# Patient Record
Sex: MALE | Race: WHITE | NOT HISPANIC OR LATINO | Employment: FULL TIME | URBAN - NONMETROPOLITAN AREA
[De-identification: names, ages, dates, MRNs, and addresses within clinical notes are randomized per-mention and may not be internally consistent; named-entity substitution may affect disease eponyms.]

---

## 2018-07-11 ENCOUNTER — HOSPITAL ENCOUNTER (EMERGENCY)
Facility: HOSPITAL | Age: 23
Discharge: HOME | End: 2018-07-11
Attending: EMERGENCY MEDICINE | Admitting: EMERGENCY MEDICINE

## 2018-07-11 VITALS
RESPIRATION RATE: 18 BRPM | SYSTOLIC BLOOD PRESSURE: 135 MMHG | HEIGHT: 68 IN | TEMPERATURE: 97.5 F | WEIGHT: 230 LBS | BODY MASS INDEX: 34.86 KG/M2 | HEART RATE: 91 BPM | OXYGEN SATURATION: 100 % | DIASTOLIC BLOOD PRESSURE: 88 MMHG

## 2018-07-11 DIAGNOSIS — M54.42 ACUTE LEFT-SIDED LOW BACK PAIN WITH LEFT-SIDED SCIATICA: Primary | ICD-10-CM

## 2018-07-11 PROCEDURE — 96372 THER/PROPH/DIAG INJ SC/IM: CPT

## 2018-07-11 PROCEDURE — 25010000002 KETOROLAC TROMETHAMINE PER 15 MG: Performed by: EMERGENCY MEDICINE

## 2018-07-11 PROCEDURE — 99283 EMERGENCY DEPT VISIT LOW MDM: CPT

## 2018-07-11 RX ORDER — KETOROLAC TROMETHAMINE 30 MG/ML
60 INJECTION, SOLUTION INTRAMUSCULAR; INTRAVENOUS ONCE
Status: COMPLETED | OUTPATIENT
Start: 2018-07-11 | End: 2018-07-11

## 2018-07-11 RX ORDER — PREDNISONE 20 MG/1
20 TABLET ORAL DAILY
Qty: 10 TABLET | Refills: 0 | Status: SHIPPED | OUTPATIENT
Start: 2018-07-11 | End: 2021-02-01

## 2018-07-11 RX ORDER — NAPROXEN 500 MG/1
500 TABLET ORAL 2 TIMES DAILY PRN
Qty: 20 TABLET | Refills: 0 | Status: SHIPPED | OUTPATIENT
Start: 2018-07-11 | End: 2021-02-01

## 2018-07-11 RX ORDER — CYCLOBENZAPRINE HCL 10 MG
10 TABLET ORAL 3 TIMES DAILY PRN
Qty: 15 TABLET | Refills: 0 | Status: SHIPPED | OUTPATIENT
Start: 2018-07-11 | End: 2021-02-01

## 2018-07-11 RX ADMIN — KETOROLAC TROMETHAMINE 60 MG: 30 INJECTION, SOLUTION INTRAMUSCULAR at 13:05

## 2018-07-11 NOTE — ED PROVIDER NOTES
Subjective   Patient is a 22-year-old male presents to emergency department complaining of low back pain.  His left sided radiates to the left leg.  There is no loss of bowel or bladder control or weakness in the legs.  There's been no saddle anesthesia.  The patient is ambulatory to the emergency department.  He states he has a history of back injury when he was in high school.            Review of Systems   Musculoskeletal: Positive for back pain.   All other systems reviewed and are negative.      Past Medical History:   Diagnosis Date   • Injury of back        No Known Allergies    History reviewed. No pertinent surgical history.    History reviewed. No pertinent family history.    Social History     Social History   • Marital status: Single     Social History Main Topics   • Smoking status: Current Some Day Smoker   • Smokeless tobacco: Never Used   • Alcohol use Yes   • Drug use: Yes     Types: Marijuana   • Sexual activity: Defer     Other Topics Concern   • Not on file           Objective   Physical Exam   Constitutional: He is oriented to person, place, and time. He appears well-developed and well-nourished.   HENT:   Head: Normocephalic and atraumatic.   Eyes: Conjunctivae are normal. Pupils are equal, round, and reactive to light.   Neck: Normal range of motion. Neck supple.   Cardiovascular: Normal rate, regular rhythm, normal heart sounds and intact distal pulses.    Pulmonary/Chest: Effort normal and breath sounds normal.   Abdominal: Soft. Bowel sounds are normal.   Musculoskeletal:   Chest tenderness to palpation in the left paraspinous musculature.  There is no midline pain.  There is normal strength and lower extremity's.  No saddle anesthesia is present.   Neurological: He is alert and oriented to person, place, and time.   Skin: Skin is warm.   Psychiatric: He has a normal mood and affect.       Procedures           ED Course                  MDM  Number of Diagnoses or Management  Options  Diagnosis management comments: Patient appears to have general muscular skeletal back pain there's no clinical or historical concerns for cauda equina syndrome        Final diagnoses:   None            Rober Ramos MD  07/11/18 5074

## 2019-03-28 ENCOUNTER — APPOINTMENT (OUTPATIENT)
Dept: CT IMAGING | Facility: HOSPITAL | Age: 24
End: 2019-03-28

## 2019-03-28 ENCOUNTER — HOSPITAL ENCOUNTER (EMERGENCY)
Facility: HOSPITAL | Age: 24
Discharge: HOME | End: 2019-03-28
Attending: EMERGENCY MEDICINE | Admitting: EMERGENCY MEDICINE

## 2019-03-28 ENCOUNTER — APPOINTMENT (OUTPATIENT)
Dept: GENERAL RADIOLOGY | Facility: HOSPITAL | Age: 24
End: 2019-03-28

## 2019-03-28 VITALS
RESPIRATION RATE: 18 BRPM | BODY MASS INDEX: 34.86 KG/M2 | DIASTOLIC BLOOD PRESSURE: 84 MMHG | HEIGHT: 68 IN | WEIGHT: 230 LBS | SYSTOLIC BLOOD PRESSURE: 123 MMHG | TEMPERATURE: 98.5 F | OXYGEN SATURATION: 99 % | HEART RATE: 108 BPM

## 2019-03-28 DIAGNOSIS — R74.8 ELEVATED LIVER ENZYMES: ICD-10-CM

## 2019-03-28 DIAGNOSIS — K92.0 HEMATEMESIS, PRESENCE OF NAUSEA NOT SPECIFIED: ICD-10-CM

## 2019-03-28 DIAGNOSIS — Z78.9 ALCOHOL USE: ICD-10-CM
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
## 2019-03-28 LAB
ALBUMIN SERPL-MCNC: 4.8 G/DL (ref 3.5–5)
ALBUMIN/GLOB SERPL: 1.3 G/DL (ref 1–2)
ALP SERPL-CCNC: 79 U/L (ref 38–126)
ALT SERPL W P-5'-P-CCNC: 120 U/L (ref 13–69)
AMPHET+METHAMPHET UR QL: NEGATIVE
AMPHETAMINES UR QL: NEGATIVE
ANION GAP SERPL CALCULATED.3IONS-SCNC: 21.1 MMOL/L (ref 10–20)
AST SERPL-CCNC: 117 U/L (ref 15–46)
BARBITURATES UR QL SCN: NEGATIVE
BASOPHILS # BLD AUTO: 0.07 10*3/MM3 (ref 0–0.2)
BASOPHILS NFR BLD AUTO: 0.9 % (ref 0–1.5)
BENZODIAZ UR QL SCN: NEGATIVE
BILIRUB SERPL-MCNC: 0.5 MG/DL (ref 0.2–1.3)
BUN BLD-MCNC: 11 MG/DL (ref 7–20)
BUN/CREAT SERPL: 15.7 (ref 6.3–21.9)
BUPRENORPHINE SERPL-MCNC: NEGATIVE NG/ML
CALCIUM SPEC-SCNC: 9 MG/DL (ref 8.4–10.2)
CANNABINOIDS SERPL QL: NEGATIVE
CHLORIDE SERPL-SCNC: 107 MMOL/L (ref 98–107)
CO2 SERPL-SCNC: 23 MMOL/L (ref 26–30)
COCAINE UR QL: NEGATIVE
CREAT BLD-MCNC: 0.7 MG/DL (ref 0.6–1.3)
DEPRECATED RDW RBC AUTO: 41.5 FL (ref 37–54)
EOSINOPHIL # BLD AUTO: 0.14 10*3/MM3 (ref 0–0.4)
EOSINOPHIL NFR BLD AUTO: 1.9 % (ref 0.3–6.2)
ERYTHROCYTE [DISTWIDTH] IN BLOOD BY AUTOMATED COUNT: 12.7 % (ref 12.3–15.4)
GFR SERPL CREATININE-BSD FRML MDRD: 137 ML/MIN/1.73
GLOBULIN UR ELPH-MCNC: 3.6 GM/DL
GLUCOSE BLD-MCNC: 126 MG/DL (ref 74–98)
HCT VFR BLD AUTO: 43.3 % (ref 37.5–51)
HGB BLD-MCNC: 15 G/DL (ref 13–17.7)
IMM GRANULOCYTES # BLD AUTO: 0.04 10*3/MM3 (ref 0–0.05)
IMM GRANULOCYTES NFR BLD AUTO: 0.5 % (ref 0–0.5)
LYMPHOCYTES # BLD AUTO: 3.86 10*3/MM3 (ref 0.7–3.1)
LYMPHOCYTES NFR BLD AUTO: 51.7 % (ref 19.6–45.3)
MCH RBC QN AUTO: 31 PG (ref 26.6–33)
MCHC RBC AUTO-ENTMCNC: 34.6 G/DL (ref 31.5–35.7)
MCV RBC AUTO: 89.5 FL (ref 79–97)
METHADONE UR QL SCN: NEGATIVE
MONOCYTES # BLD AUTO: 0.49 10*3/MM3 (ref 0.1–0.9)
MONOCYTES NFR BLD AUTO: 6.6 % (ref 5–12)
NEUTROPHILS # BLD AUTO: 2.87 10*3/MM3 (ref 1.4–7)
NEUTROPHILS NFR BLD AUTO: 38.4 % (ref 42.7–76)
NRBC BLD AUTO-RTO: 0 /100 WBC (ref 0–0)
OPIATES UR QL: NEGATIVE
OXYCODONE UR QL SCN: NEGATIVE
PCP UR QL SCN: NEGATIVE
PLATELET # BLD AUTO: 177 10*3/MM3 (ref 140–450)
PMV BLD AUTO: 10.2 FL (ref 6–12)
POTASSIUM BLD-SCNC: 4.1 MMOL/L (ref 3.5–5.1)
PROPOXYPH UR QL: NEGATIVE
PROT SERPL-MCNC: 8.4 G/DL (ref 6.3–8.2)
RBC # BLD AUTO: 4.84 10*6/MM3 (ref 4.14–5.8)
SODIUM BLD-SCNC: 147 MMOL/L (ref 137–145)
TRICYCLICS UR QL SCN: NEGATIVE
TROPONIN I SERPL-MCNC: <0.012 NG/ML (ref 0–0.03)
WBC NRBC COR # BLD: 7.47 10*3/MM3 (ref 3.4–10.8)

## 2019-03-28 PROCEDURE — 70450 CT HEAD/BRAIN W/O DYE: CPT

## 2019-03-28 PROCEDURE — 85025 COMPLETE CBC W/AUTO DIFF WBC: CPT | Performed by: PHYSICIAN ASSISTANT

## 2019-03-28 PROCEDURE — 74177 CT ABD & PELVIS W/CONTRAST: CPT

## 2019-03-28 PROCEDURE — 72125 CT NECK SPINE W/O DYE: CPT

## 2019-03-28 PROCEDURE — 80053 COMPREHEN METABOLIC PANEL: CPT | Performed by: PHYSICIAN ASSISTANT

## 2019-03-28 PROCEDURE — 84484 ASSAY OF TROPONIN QUANT: CPT | Performed by: PHYSICIAN ASSISTANT

## 2019-03-28 PROCEDURE — 71046 X-RAY EXAM CHEST 2 VIEWS: CPT

## 2019-03-28 PROCEDURE — 99283 EMERGENCY DEPT VISIT LOW MDM: CPT

## 2019-03-28 PROCEDURE — 80307 DRUG TEST PRSMV CHEM ANLYZR: CPT | Performed by: PHYSICIAN ASSISTANT

## 2019-03-28 PROCEDURE — 25010000002 IOPAMIDOL 61 % SOLUTION: Performed by: EMERGENCY MEDICINE

## 2019-03-28 PROCEDURE — 80306 DRUG TEST PRSMV INSTRMNT: CPT | Performed by: PHYSICIAN ASSISTANT

## 2019-03-28 PROCEDURE — 93005 ELECTROCARDIOGRAM TRACING: CPT | Performed by: PHYSICIAN ASSISTANT

## 2019-03-28 RX ORDER — SODIUM CHLORIDE 0.9 % (FLUSH) 0.9 %
10 SYRINGE (ML) INJECTION AS NEEDED
Status: DISCONTINUED | OUTPATIENT
Start: 2019-03-28 | End: 2019-03-29 | Stop reason: HOSPADM

## 2019-03-28 RX ADMIN — IOPAMIDOL 100 ML: 612 INJECTION, SOLUTION INTRAVENOUS at 20:20

## 2019-03-28 RX ADMIN — SODIUM CHLORIDE 1000 ML: 9 INJECTION, SOLUTION INTRAVENOUS at 18:34

## 2019-03-29 LAB
ETHANOL BLD-MCNC: 426 MG/DL
ETHANOL UR QL: 0.43 %

## 2021-02-01 ENCOUNTER — HOSPITAL ENCOUNTER (OUTPATIENT)
Dept: GENERAL RADIOLOGY | Facility: HOSPITAL | Age: 26
Discharge: HOME | End: 2021-02-01

## 2021-02-01 ENCOUNTER — LAB (OUTPATIENT)
Dept: LAB | Facility: HOSPITAL | Age: 26
End: 2021-02-01

## 2021-02-01 ENCOUNTER — OFFICE VISIT (OUTPATIENT)
Dept: INTERNAL MEDICINE | Facility: CLINIC | Age: 26
End: 2021-02-01

## 2021-02-01 VITALS
TEMPERATURE: 98.6 F | WEIGHT: 231.8 LBS | HEIGHT: 68 IN | BODY MASS INDEX: 35.13 KG/M2 | OXYGEN SATURATION: 92 % | HEART RATE: 61 BPM | DIASTOLIC BLOOD PRESSURE: 70 MMHG | SYSTOLIC BLOOD PRESSURE: 110 MMHG

## 2021-02-01 DIAGNOSIS — Z13.220 SCREENING, LIPID: ICD-10-CM

## 2021-02-01 DIAGNOSIS — R79.89 ELEVATED LFTS: ICD-10-CM

## 2021-02-01 DIAGNOSIS — Z13.1 SCREENING FOR DIABETES MELLITUS: ICD-10-CM

## 2021-02-01 DIAGNOSIS — Z23 NEED FOR VACCINATION: ICD-10-CM

## 2021-02-01 DIAGNOSIS — G89.29 CHRONIC HIP PAIN, RIGHT: ICD-10-CM

## 2021-02-01 DIAGNOSIS — M25.551 CHRONIC HIP PAIN, RIGHT: ICD-10-CM

## 2021-02-01 DIAGNOSIS — K42.9 UMBILICAL HERNIA WITHOUT OBSTRUCTION AND WITHOUT GANGRENE: ICD-10-CM

## 2021-02-01 DIAGNOSIS — Z53.20 SCREENING FOR HEPATITIS C DECLINED: ICD-10-CM

## 2021-02-01 DIAGNOSIS — G47.00 INSOMNIA, UNSPECIFIED TYPE: ICD-10-CM

## 2021-02-01 DIAGNOSIS — E66.3 OVERWEIGHT: ICD-10-CM

## 2021-02-01 DIAGNOSIS — Z71.3 DIETARY COUNSELING: ICD-10-CM

## 2021-02-01 DIAGNOSIS — Z71.82 EXERCISE COUNSELING: ICD-10-CM

## 2021-02-01 DIAGNOSIS — Z00.00 ROUTINE GENERAL MEDICAL EXAMINATION AT A HEALTH CARE FACILITY: Primary | ICD-10-CM

## 2021-02-01 LAB
ALBUMIN SERPL-MCNC: 4.8 G/DL (ref 3.5–5.2)
ALBUMIN/GLOB SERPL: 1.9 G/DL
ALP SERPL-CCNC: 66 U/L (ref 39–117)
ALT SERPL W P-5'-P-CCNC: 18 U/L (ref 1–41)
ANION GAP SERPL CALCULATED.3IONS-SCNC: 11.8 MMOL/L (ref 5–15)
AST SERPL-CCNC: 21 U/L (ref 1–40)
BASOPHILS # BLD AUTO: 0.04 10*3/MM3 (ref 0–0.2)
BASOPHILS NFR BLD AUTO: 0.5 % (ref 0–1.5)
BILIRUB SERPL-MCNC: 0.4 MG/DL (ref 0–1.2)
BUN SERPL-MCNC: 17 MG/DL (ref 6–20)
BUN/CREAT SERPL: 22.7 (ref 7–25)
CALCIUM SPEC-SCNC: 9.8 MG/DL (ref 8.6–10.5)
CHLORIDE SERPL-SCNC: 101 MMOL/L (ref 98–107)
CHOLEST SERPL-MCNC: 253 MG/DL (ref 0–200)
CO2 SERPL-SCNC: 24.2 MMOL/L (ref 22–29)
CREAT SERPL-MCNC: 0.75 MG/DL (ref 0.76–1.27)
DEPRECATED RDW RBC AUTO: 40.5 FL (ref 37–54)
EOSINOPHIL # BLD AUTO: 0.19 10*3/MM3 (ref 0–0.4)
EOSINOPHIL NFR BLD AUTO: 2.2 % (ref 0.3–6.2)
ERYTHROCYTE [DISTWIDTH] IN BLOOD BY AUTOMATED COUNT: 13.3 % (ref 12.3–15.4)
GFR SERPL CREATININE-BSD FRML MDRD: 125 ML/MIN/1.73
GLOBULIN UR ELPH-MCNC: 2.5 GM/DL
GLUCOSE SERPL-MCNC: 80 MG/DL (ref 65–99)
HCT VFR BLD AUTO: 44.5 % (ref 37.5–51)
HCV AB SER DONR QL: NORMAL
HDLC SERPL-MCNC: 44 MG/DL (ref 40–60)
HGB BLD-MCNC: 15.2 G/DL (ref 13–17.7)
LDLC SERPL CALC-MCNC: 198 MG/DL (ref 0–100)
LDLC/HDLC SERPL: 4.45 {RATIO}
LYMPHOCYTES # BLD AUTO: 3.67 10*3/MM3 (ref 0.7–3.1)
LYMPHOCYTES NFR BLD AUTO: 42.9 % (ref 19.6–45.3)
MCH RBC QN AUTO: 28.8 PG (ref 26.6–33)
MCHC RBC AUTO-ENTMCNC: 34.2 G/DL (ref 31.5–35.7)
MCV RBC AUTO: 84.3 FL (ref 79–97)
MONOCYTES # BLD AUTO: 0.48 10*3/MM3 (ref 0.1–0.9)
MONOCYTES NFR BLD AUTO: 5.6 % (ref 5–12)
NEUTROPHILS NFR BLD AUTO: 4.13 10*3/MM3 (ref 1.7–7)
NEUTROPHILS NFR BLD AUTO: 48.3 % (ref 42.7–76)
PLATELET # BLD AUTO: 141 10*3/MM3 (ref 140–450)
PMV BLD AUTO: 12.1 FL (ref 6–12)
POTASSIUM SERPL-SCNC: 4.2 MMOL/L (ref 3.5–5.2)
PROT SERPL-MCNC: 7.3 G/DL (ref 6–8.5)
RBC # BLD AUTO: 5.28 10*6/MM3 (ref 4.14–5.8)
SODIUM SERPL-SCNC: 137 MMOL/L (ref 136–145)
TRIGL SERPL-MCNC: 65 MG/DL (ref 0–150)
VLDLC SERPL-MCNC: 11 MG/DL (ref 5–40)
WBC # BLD AUTO: 8.55 10*3/MM3 (ref 3.4–10.8)

## 2021-02-01 PROCEDURE — 99395 PREV VISIT EST AGE 18-39: CPT | Performed by: PHYSICIAN ASSISTANT

## 2021-02-01 PROCEDURE — 36415 COLL VENOUS BLD VENIPUNCTURE: CPT

## 2021-02-01 PROCEDURE — 85025 COMPLETE CBC W/AUTO DIFF WBC: CPT | Performed by: PHYSICIAN ASSISTANT

## 2021-02-01 PROCEDURE — 80053 COMPREHEN METABOLIC PANEL: CPT | Performed by: PHYSICIAN ASSISTANT

## 2021-02-01 PROCEDURE — 73502 X-RAY EXAM HIP UNI 2-3 VIEWS: CPT

## 2021-02-01 PROCEDURE — 90715 TDAP VACCINE 7 YRS/> IM: CPT | Performed by: PHYSICIAN ASSISTANT

## 2021-02-01 PROCEDURE — 83036 HEMOGLOBIN GLYCOSYLATED A1C: CPT | Performed by: PHYSICIAN ASSISTANT

## 2021-02-01 PROCEDURE — 80061 LIPID PANEL: CPT | Performed by: PHYSICIAN ASSISTANT

## 2021-02-01 PROCEDURE — 90471 IMMUNIZATION ADMIN: CPT | Performed by: PHYSICIAN ASSISTANT

## 2021-02-01 PROCEDURE — 86803 HEPATITIS C AB TEST: CPT | Performed by: PHYSICIAN ASSISTANT

## 2021-02-01 NOTE — PROGRESS NOTES
Venu Jessica Bustillos 25 y.o. male presents today to establish care.    Establish Care, Annual Exam, and Hip Pain       Hip Pain: started 1.5 yrs ago after a fall at work and has flared up with other slips and falls (hx of football injuries as well, has been rough on his body). He did have sciatica on the right side in the past after hurting his back. Is going to chiropractor. Taking tumeric and glucosamine regularly and will take IBU if it acts up. It bothers him on a regular basis. Has clicking of his hip sometimes with walking. Does not have swelling or redness of hip. Lifting his leg up causes pain.    Hx Alcohol Abuse: had elevated LFTs at ER visit 1-2 yrs ago that was likely related to heavy alcohol abuse. Has not had alcohol for 2 years now. Works at [employer] now after going through their program for drug/alcohol abuse.    Anx/Insomnia: does not have daily problem with anxiety but does have trouble sleeping sometimes due to his mind racing. Does not have hi/si, or depression.    Strong fhx of heart problems, mom had cardiac bypass sx age 29. Is not fasting today.    Had STD testing last year, has tested negative for Hep C and HIV.  Has not been sexually active in the last year.    Umbilical hernia: feels like this started a few years ago with hard coughing. Sometimes it sticks out when he coughs or does exercise.    Has not seen dentist or eye dr recently, is planning on scheduling these soon.    Diet/exercise: is working on weight loss. Has lost 65 lbs over the last year due to healthier eating and working out at the gym.        Review of Systems   Constitutional: Negative for fever and unexpected weight loss.   HENT: Negative for congestion, rhinorrhea, sneezing and sore throat.    Eyes: Negative for visual disturbance.   Respiratory: Negative for cough, shortness of breath and wheezing.    Cardiovascular: Negative for chest pain and palpitations.   Gastrointestinal: Negative for abdominal pain,  "constipation, diarrhea, nausea and vomiting.   Endocrine: Negative for polydipsia and polyuria.   Musculoskeletal: Positive for back pain. Negative for arthralgias, joint swelling and neck pain.   Skin: Negative for color change and rash.   Neurological: Positive for headache. Negative for dizziness and seizures.   Psychiatric/Behavioral: Positive for sleep disturbance. Negative for decreased concentration, self-injury and suicidal ideas.        Past Medical History:   Diagnosis Date   • Injury of back         Past Surgical History:   Procedure Laterality Date   • TONSILLECTOMY          Family History   Problem Relation Age of Onset   • Diabetes Mother    • Diabetes Father         Social History     Socioeconomic History   • Marital status: Single     Spouse name: Not on file   • Number of children: Not on file   • Years of education: Not on file   • Highest education level: Not on file   Tobacco Use   • Smoking status: Current Some Day Smoker   • Smokeless tobacco: Never Used   Substance and Sexual Activity   • Alcohol use: Not Currently   • Drug use: Not Currently     Types: Marijuana   • Sexual activity: Defer        No current outpatient medications on file prior to visit.     No current facility-administered medications on file prior to visit.        No Known Allergies     Vitals:    02/01/21 1307 02/01/21 1318   BP:  110/70   Pulse:  61   Temp: 97.1 °F (36.2 °C) 98.6 °F (37 °C)   TempSrc: Temporal Temporal   SpO2:  92%   Weight:  105 kg (231 lb 12.8 oz)   Height:  171.5 cm (67.5\")      Body mass index is 35.77 kg/m².    Physical Exam  Vitals signs reviewed.   Constitutional:       General: He is not in acute distress.     Appearance: Normal appearance. He is not ill-appearing.   HENT:      Head: Normocephalic and atraumatic.      Right Ear: Tympanic membrane, ear canal and external ear normal.      Left Ear: Tympanic membrane, ear canal and external ear normal.      Mouth/Throat:      Mouth: Mucous membranes " are moist.      Pharynx: No oropharyngeal exudate or posterior oropharyngeal erythema.   Eyes:      General: No scleral icterus.     Extraocular Movements: Extraocular movements intact.      Conjunctiva/sclera: Conjunctivae normal.      Pupils: Pupils are equal, round, and reactive to light.   Neck:      Musculoskeletal: Normal range of motion and neck supple.   Cardiovascular:      Rate and Rhythm: Normal rate and regular rhythm.      Pulses: Normal pulses.      Heart sounds: Normal heart sounds. No murmur.   Pulmonary:      Effort: Pulmonary effort is normal. No respiratory distress.      Breath sounds: Normal breath sounds. No stridor. No wheezing, rhonchi or rales.   Abdominal:      General: Bowel sounds are normal. There is no distension.      Palpations: Abdomen is soft. There is no mass.      Tenderness: There is no abdominal tenderness. There is no guarding.      Hernia: A hernia is present.      Comments: Very small umbilical hernia   Musculoskeletal: Normal range of motion.         General: No signs of injury.      Right lower leg: No edema.      Left lower leg: No edema.   Lymphadenopathy:      Cervical: No cervical adenopathy.   Skin:     General: Skin is warm and dry.      Coloration: Skin is not jaundiced.      Findings: No rash.   Neurological:      General: No focal deficit present.      Mental Status: He is alert and oriented to person, place, and time.      Gait: Gait normal.   Psychiatric:         Mood and Affect: Mood normal.         Behavior: Behavior normal.          Diagnoses and all orders for this visit:    1. Routine general medical examination at a health care facility (Primary)    2. Chronic hip pain, right  Comments:  check xray, if nl will do PT, if abnl can refer to ortho. cont ibu prn for pain  Orders:  -     XR Hip With or Without Pelvis 2 - 3 View Right; Future    3. Insomnia, unspecified type  Comments:  melatonin prn, consider therapy to address mild anxiety sx.  Orders:  -      CBC Auto Differential; Future    4. Elevated LFTs  Comments:  recheck cmp today  Orders:  -     Comprehensive Metabolic Panel; Future    5. Umbilical hernia without obstruction and without gangrene  Comments:  monitor at this time, is hard to appreciate    6. Screening for hepatitis C declined  -     Hepatitis C Antibody; Future    7. Screening for diabetes mellitus  -     Hemoglobin A1c; Future    8. Screening, lipid  -     Lipid Panel; Future    9. Need for vaccination  Comments:  tdap today, pt refused flu shot.  Orders:  -     Tdap Vaccine Greater Than or Equal To 6yo IM    10. Overweight    11. Dietary counseling    12. Exercise counseling    Other orders  -     Cancel: TSH Rfx On Abnormal To Free T4; Future      Counseled on health maintenance topics and preventative care recommendations. Follow up yearly for routine physical exams. Diet and exercise counseling given. See dentist and eye doctor yearly as directed.    If labs or images are ordered we will contact you with the results within the next week.  If you have not heard from us after a week please call our office to inquire about the results.    Patient was given instructions and counseling regarding his condition or for health maintenance advice. Please see specific information pulled into the AVS if appropriate.     Return in about 4 weeks (around 3/1/2021).    Catherine Edwards PA-C

## 2021-02-02 ENCOUNTER — TELEPHONE (OUTPATIENT)
Dept: INTERNAL MEDICINE | Facility: CLINIC | Age: 26
End: 2021-02-02

## 2021-02-02 DIAGNOSIS — M16.11 OSTEOARTHRITIS OF RIGHT HIP, UNSPECIFIED OSTEOARTHRITIS TYPE: Primary | ICD-10-CM

## 2021-02-02 DIAGNOSIS — E78.5 HYPERLIPIDEMIA, UNSPECIFIED HYPERLIPIDEMIA TYPE: Primary | ICD-10-CM

## 2021-02-02 LAB — HBA1C MFR BLD: 5.2 % (ref 4.8–5.6)

## 2021-02-02 RX ORDER — ATORVASTATIN CALCIUM 40 MG/1
40 TABLET, FILM COATED ORAL DAILY
Qty: 30 TABLET | Refills: 2 | Status: SHIPPED | OUTPATIENT
Start: 2021-02-02 | End: 2021-06-16 | Stop reason: SDUPTHER

## 2021-02-02 NOTE — PROGRESS NOTES
Please call the patient regarding abnormal labs. His liver enzymes are back to normal. However his bad cholesterol is VERY high, so high that regardless of his young age a statin would be recommended. In light of his strong family history for heart problems I feel this is an inherited condition for him and diet/exercise may help but he will still ultimately need cholesterol medication. I will send to the pharmacy.

## 2021-02-02 NOTE — TELEPHONE ENCOUNTER
PN pt expressed understanding. He stated he wants to see if he could fast and redo labs and wants to come  copy of results today so he could run it by his mother who is a nurse. I informed labs will be at the .       ----- Message from Catherine Edwards PA-C sent at 2/2/2021  8:50 AM EST -----  Please call the patient regarding abnormal labs. His liver enzymes are back to normal. However his bad cholesterol is VERY high, so high that regardless of his young age a statin would be recommended. In light of his strong family history for heart problems I feel this is an inherited condition for him and diet/exercise may help but he will still ultimately need cholesterol medication. I will send to the pharmacy.

## 2021-02-03 ENCOUNTER — TELEPHONE (OUTPATIENT)
Dept: INTERNAL MEDICINE | Facility: CLINIC | Age: 26
End: 2021-02-03

## 2021-02-03 NOTE — TELEPHONE ENCOUNTER
Contacted pt lft vm to cb to inform of XR results       ----- Message from Catherine Edwards PA-C sent at 2/2/2021 10:15 PM EST -----  He has degenerative changes in his hip suggestive of arthritis. We can refer to ortho for evaluation and management options.

## 2021-02-03 NOTE — TELEPHONE ENCOUNTER
Caller: Venu Bain    Relationship to patient: Self    Best call back number: [phone]    Patient is needing: Patient is returning April's call.

## 2021-02-03 NOTE — PROGRESS NOTES
He has degenerative changes in his hip suggestive of arthritis. We can refer to ortho for evaluation and management options.

## 2021-02-03 NOTE — TELEPHONE ENCOUNTER
LVM for the patient to return our call, office number was provided      Please see other telephone message as well. Okay to relay both to the patient.

## 2021-02-03 NOTE — TELEPHONE ENCOUNTER
LVM for the patient to return our call, office number was provided      Please see other telephone message. Okay to relay both to the patient when he returns our call.

## 2021-02-03 NOTE — TELEPHONE ENCOUNTER
----- Message from Catherine Edwards PA-C sent at 2/2/2021 10:15 PM EST -----  He has degenerative changes in his hip suggestive of arthritis. We can refer to ortho for evaluation and management options.

## 2021-02-03 NOTE — TELEPHONE ENCOUNTER
Yes he can come in anytime for fasting lipids, I will put the order in. If he wants to work on diet and exercise for another month or two and then get them repeated that is ok as well.

## 2021-02-05 NOTE — TELEPHONE ENCOUNTER
PN pt expressed understanding and will come do labs when he is ready.      Yes he can come in anytime for fasting lipids, I will put the order in. If he wants to work on diet and exercise for another month or two and then get them repeated that is ok as well.    -Catherine Edwards

## 2021-02-08 ENCOUNTER — OFFICE VISIT (OUTPATIENT)
Dept: ORTHOPEDIC SURGERY | Facility: CLINIC | Age: 26
End: 2021-02-08

## 2021-02-08 VITALS — HEART RATE: 88 BPM | OXYGEN SATURATION: 99 % | HEIGHT: 68 IN | BODY MASS INDEX: 35.08 KG/M2 | WEIGHT: 231.48 LBS

## 2021-02-08 DIAGNOSIS — M25.551 RIGHT HIP PAIN: Primary | ICD-10-CM

## 2021-02-08 DIAGNOSIS — M25.859 FEMORAL ACETABULAR IMPINGEMENT: ICD-10-CM

## 2021-02-08 PROCEDURE — 99204 OFFICE O/P NEW MOD 45 MIN: CPT | Performed by: ORTHOPAEDIC SURGERY

## 2021-02-08 NOTE — PROGRESS NOTES
"      [facility] Note    Subjective     CC: Pain of the Right Hip      HPI    Venu Bustillos is a 25 y.o. male who presents with new problem of: right hip pain.  Onset: atraumatic and gradual in nature. The issue has been ongoing for 2 year(s). Pain is a 2/10 on the pain scale. Pain is described as dull and aching. Associated symptoms include pain and giving way/buckling. The pain is worse with walking and climbing stairs; resting improve the pain. Previous treatments have included: weight loss and home exercise program.    I have reviewed the following portions of the patient's history:History of Present Illness and review of systems.          Review of Systems   Constitutional: Negative.    HENT: Negative.    Eyes: Negative.    Respiratory: Negative.    Cardiovascular: Negative.    Gastrointestinal: Negative.    Endocrine: Negative.    Genitourinary: Negative.    Musculoskeletal: Positive for arthralgias.   Skin: Negative.    Allergic/Immunologic: Negative.    Neurological: Negative.    Hematological: Negative.    Psychiatric/Behavioral: Negative.        ROS:    Constiutional:Pt denies fever, chills, nausea, or vomiting.  MSK:as above      Objective      Past Medical History  Past Medical History:   Diagnosis Date   • Injury of back          Physical Exam  Pulse 88   Ht 171.5 cm (67.52\")   Wt 105 kg (231 lb 7.7 oz)   SpO2 99%   BMI 35.70 kg/m²     Body mass index is 35.7 kg/m².    Patient is well nourished and well developed.        Ortho Exam  He lacks internal rotation.  Painful range of motion right hip.  Distally neuro is intact.  Leg lengths equal.  Slight antalgic gait.    Imaging/Labs/EMG Reviewed:  Imaging Results (Last 24 Hours)     ** No results found for the last 24 hours. **      Abuse x-rays from February 1 which show cam and pincer femoral acetabular impingement.  There is a lucency in his anterior lateral femoral head worrisome for avascular " necrosis.    Assessment:  1. Right hip pain    2. Femoral acetabular impingement        Plan:  1. Recommend over the counter anti-inflammatories for pain and/or swelling  2. I have ordered an MRI to rule out avascular necrosis.  He deftly has femoral acetabular impingement.  I will see him back after the MRI.  He has a concerning lucency in his femoral head.  He may use a cane or crutches in the meantime.  Over-the-counter medicine.    Follow Up:   Return for After MRI.      Medical Decision Making  Management Options : Moderate - 1 Undiagnosed New Problem with Uncertain Prognosis and 1 of 3 Categories = Category 1 - Tests/Documents/Review of History Category 2 - Independent Interpret Tests Category 3 - Discussion of Mgmt or test interpretation with external MD Demetri Crocker M.D., [facility]  Orthopedic Surgeon  Fellowship Trained Sports Medicine  [facility]  Orthopedics and Sports Medicine  [address] [place] [address], [address]  [place] [address]

## 2021-02-22 ENCOUNTER — HOSPITAL ENCOUNTER (OUTPATIENT)
Dept: MRI IMAGING | Facility: HOSPITAL | Age: 26
Discharge: HOME | End: 2021-02-22
Admitting: ORTHOPAEDIC SURGERY

## 2021-02-22 DIAGNOSIS — M25.859 FEMORAL ACETABULAR IMPINGEMENT: ICD-10-CM

## 2021-02-22 DIAGNOSIS — M25.551 RIGHT HIP PAIN: ICD-10-CM

## 2021-02-22 PROCEDURE — 73721 MRI JNT OF LWR EXTRE W/O DYE: CPT

## 2021-03-01 ENCOUNTER — OFFICE VISIT (OUTPATIENT)
Dept: ORTHOPEDIC SURGERY | Facility: CLINIC | Age: 26
End: 2021-03-01

## 2021-03-01 VITALS — HEIGHT: 68 IN | HEART RATE: 65 BPM | WEIGHT: 227.07 LBS | BODY MASS INDEX: 34.41 KG/M2 | OXYGEN SATURATION: 98 %

## 2021-03-01 DIAGNOSIS — M87.051 AVASCULAR NECROSIS OF BONE OF HIP, RIGHT (HCC): Primary | ICD-10-CM

## 2021-03-01 PROCEDURE — 99213 OFFICE O/P EST LOW 20 MIN: CPT | Performed by: ORTHOPAEDIC SURGERY

## 2021-03-01 NOTE — PROGRESS NOTES
"      [facility] [place] [facility] [place]    Subjective     CC: Follow-up (Post MRI 02/22/2021 - Right hip pain )      HPI    Venu Bustillos is a 25 y.o. male.  He hurt his hip November 2019.  He has been sober for 2 years.  He works in a restaurant kitchen right now.  Pain with certain activity.    Review of Systems   Constitutional: Negative.  Negative for chills, fatigue and fever.   HENT: Negative.  Negative for congestion and dental problem.    Eyes: Negative.  Negative for blurred vision.   Respiratory: Negative.  Negative for shortness of breath.    Cardiovascular: Negative.  Negative for leg swelling.   Gastrointestinal: Negative.  Negative for abdominal pain.   Endocrine: Negative.  Negative for polyuria.   Genitourinary: Negative.  Negative for difficulty urinating.   Musculoskeletal: Positive for arthralgias.   Skin: Negative.    Allergic/Immunologic: Negative.    Neurological: Negative.    Hematological: Negative.  Negative for adenopathy.   Psychiatric/Behavioral: Negative.  Negative for behavioral problems.       ROS:    Constiutional:Pt denies fever, chills, nausea, or vomiting.  MSK:as above      Objective      Past Medical History  Past Medical History:   Diagnosis Date   • Injury of back          Physical Exam  Pulse 65   Ht 171.5 cm (67.52\")   Wt 103 kg (227 lb 1.2 oz)   SpO2 98%   BMI 35.02 kg/m²     Body mass index is 35.02 kg/m².    Patient is well nourished and well developed.        Ortho Exam  Some pain with hip internal ex rotation and hip flexion.    Imaging/Labs/EMG Reviewed:  Imaging Results (Last 24 Hours)     ** No results found for the last 24 hours. **        MRI Hip Right Without Contrast (02/22/2021 09:50)    Assessment:  1. Avascular necrosis of bone of hip, right (CMS/[facility])        Plan:  1. Recommend over the counter anti-inflammatories for pain and/or swelling  2. I will refer him to Dr. Andrew Estrella the [facility].  He is a hip specialist on hip preservation techniques " which might be an option at his young age.    Follow Up:   No follow-ups on file.      Medical Decision Making  Management Options : Low - 1 of 2 Categories = Category 1 - Review of Tests and Documents Category 2 - Assessment requiring an independent interpretation of tests         Demetri Crocker M.D., FAAOS  Orthopedic Surgeon  Fellowship Trained Sports Medicine  [facility]  Orthopedics and Sports Medicine  [address] [place] [address], [address]  [place] [address]

## 2021-04-13 ENCOUNTER — LAB (OUTPATIENT)
Dept: LAB | Facility: HOSPITAL | Age: 26
End: 2021-04-13

## 2021-04-13 DIAGNOSIS — E78.5 HYPERLIPIDEMIA, UNSPECIFIED HYPERLIPIDEMIA TYPE: ICD-10-CM

## 2021-04-13 LAB
CHOLEST SERPL-MCNC: 246 MG/DL (ref 0–200)
HDLC SERPL-MCNC: 41 MG/DL (ref 40–60)
LDLC SERPL CALC-MCNC: 197 MG/DL (ref 0–100)
LDLC/HDLC SERPL: 4.75 {RATIO}
TRIGL SERPL-MCNC: 51 MG/DL (ref 0–150)
VLDLC SERPL-MCNC: 8 MG/DL (ref 5–40)

## 2021-04-13 PROCEDURE — 80061 LIPID PANEL: CPT | Performed by: PHYSICIAN ASSISTANT

## 2021-04-16 NOTE — PROGRESS NOTES
Please let patient know his LDL cholesterol is still just as high as it was 2 months ago.  A cholesterol medicine would be recommended despite his young age due to the very high level of LDL cholesterol.  If he is wanting to avoid starting a statin at this time we could try a supplement such as over-the-counter fish oil along with talking to a nutritionist about the diet and exercise changes he can make.  Did he  the medicine that we sent in a few months ago?  Please let me know if he has any questions or concerns and how he would like to proceed.

## 2021-04-20 ENCOUNTER — TELEPHONE (OUTPATIENT)
Dept: INTERNAL MEDICINE | Facility: CLINIC | Age: 26
End: 2021-04-20

## 2021-04-20 DIAGNOSIS — E78.5 HYPERLIPIDEMIA, UNSPECIFIED HYPERLIPIDEMIA TYPE: Primary | ICD-10-CM

## 2021-04-20 NOTE — TELEPHONE ENCOUNTER
----- Message from Catherine Edwards PA-C sent at 4/20/2021  3:43 PM EDT -----  He can continue the over the counter fish oil/krill oil supplements and work on weight loss, diet, exercise. Red Yeast Rice is another supplement for cholesterol lowering that he can look into. Please schedule follow up for 3 months so we can recheck cholesterol at that time.  I placed a referral for nutritionist so they can discuss more detailed diet changes for managing cholesterol.

## 2021-06-01 ENCOUNTER — APPOINTMENT (OUTPATIENT)
Dept: NUTRITION | Facility: HOSPITAL | Age: 26
End: 2021-06-01

## 2021-06-16 ENCOUNTER — OFFICE VISIT (OUTPATIENT)
Dept: INTERNAL MEDICINE | Facility: CLINIC | Age: 26
End: 2021-06-16

## 2021-06-16 ENCOUNTER — LAB (OUTPATIENT)
Dept: LAB | Facility: HOSPITAL | Age: 26
End: 2021-06-16

## 2021-06-16 VITALS
DIASTOLIC BLOOD PRESSURE: 78 MMHG | TEMPERATURE: 97.5 F | WEIGHT: 230 LBS | BODY MASS INDEX: 34.86 KG/M2 | SYSTOLIC BLOOD PRESSURE: 122 MMHG | HEART RATE: 75 BPM | OXYGEN SATURATION: 98 % | HEIGHT: 68 IN | RESPIRATION RATE: 20 BRPM

## 2021-06-16 DIAGNOSIS — R53.83 FATIGUE, UNSPECIFIED TYPE: ICD-10-CM

## 2021-06-16 DIAGNOSIS — E78.5 HYPERLIPIDEMIA, UNSPECIFIED HYPERLIPIDEMIA TYPE: Primary | ICD-10-CM

## 2021-06-16 DIAGNOSIS — E78.5 HYPERLIPIDEMIA, UNSPECIFIED HYPERLIPIDEMIA TYPE: ICD-10-CM

## 2021-06-16 DIAGNOSIS — M87.00 AVASCULAR NECROSIS (HCC): ICD-10-CM

## 2021-06-16 DIAGNOSIS — F41.9 ANXIETY: ICD-10-CM

## 2021-06-16 DIAGNOSIS — Z83.3 FAMILY HISTORY OF DIABETES MELLITUS: ICD-10-CM

## 2021-06-16 LAB
CHOLEST SERPL-MCNC: 291 MG/DL (ref 0–200)
HBA1C MFR BLD: 5.4 %
HDLC SERPL-MCNC: 54 MG/DL (ref 40–60)
LDLC SERPL CALC-MCNC: 227 MG/DL (ref 0–100)
LDLC/HDLC SERPL: 4.14 {RATIO}
TESTOST SERPL-MCNC: 434 NG/DL (ref 249–836)
TRIGL SERPL-MCNC: 66 MG/DL (ref 0–150)
TSH SERPL DL<=0.05 MIU/L-ACNC: 1.71 UIU/ML (ref 0.27–4.2)
VLDLC SERPL-MCNC: 10 MG/DL (ref 5–40)

## 2021-06-16 PROCEDURE — 99214 OFFICE O/P EST MOD 30 MIN: CPT | Performed by: PHYSICIAN ASSISTANT

## 2021-06-16 PROCEDURE — 80061 LIPID PANEL: CPT | Performed by: PHYSICIAN ASSISTANT

## 2021-06-16 PROCEDURE — 84403 ASSAY OF TOTAL TESTOSTERONE: CPT | Performed by: PHYSICIAN ASSISTANT

## 2021-06-16 PROCEDURE — 84443 ASSAY THYROID STIM HORMONE: CPT | Performed by: PHYSICIAN ASSISTANT

## 2021-06-16 PROCEDURE — 36415 COLL VENOUS BLD VENIPUNCTURE: CPT

## 2021-06-16 PROCEDURE — 83036 HEMOGLOBIN GLYCOSYLATED A1C: CPT | Performed by: PHYSICIAN ASSISTANT

## 2021-06-16 RX ORDER — NAPROXEN SODIUM 500 MG/1
1000 TABLET, FILM COATED, EXTENDED RELEASE ORAL DAILY
COMMUNITY
End: 2021-12-14

## 2021-06-16 RX ORDER — HYDROXYZINE PAMOATE 25 MG/1
25-50 CAPSULE ORAL NIGHTLY PRN
Qty: 60 CAPSULE | Refills: 1 | Status: SHIPPED | OUTPATIENT
Start: 2021-06-16 | End: 2022-06-21

## 2021-06-16 RX ORDER — TRAMADOL HYDROCHLORIDE 50 MG/1
TABLET ORAL
COMMUNITY
Start: 2021-06-10 | End: 2021-09-16

## 2021-06-16 RX ORDER — CHLORAL HYDRATE 500 MG
CAPSULE ORAL
COMMUNITY

## 2021-06-16 RX ORDER — ASPIRIN 81 MG/1
81 TABLET, CHEWABLE ORAL
COMMUNITY
End: 2021-12-14

## 2021-06-16 RX ORDER — ATORVASTATIN CALCIUM 10 MG/1
10 TABLET, FILM COATED ORAL DAILY
Qty: 30 TABLET | Refills: 2 | Status: SHIPPED | OUTPATIENT
Start: 2021-06-16 | End: 2021-09-17 | Stop reason: SDUPTHER

## 2021-06-16 RX ORDER — NAPROXEN 500 MG/1
TABLET ORAL
COMMUNITY
Start: 2021-05-25 | End: 2021-06-16 | Stop reason: SDUPTHER

## 2021-06-16 NOTE — PROGRESS NOTES
Chief Complaint  Hyperlipidemia and Diabetes (family history of diabetes / pt wants A1C checked )    Subjective          History of Present Illness  Venu Bustillos presents to [facility] for   HLD:  Has hx of elevated LDL since his teenage years, had tried to work out and loose weight but it did not change his LDL when we checked it 2  Mo ago. Is hesitant about starting on chronic statin therapy.     AVN:  Had right hip surgery 3 weeks ago, doing well. Saw ortho Dr Estrella at  for this surgery. Hip pain is improved. Has f/u in July for this and they will eval his left hip as well.    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  Does work out to help anxiety/depression but can't now d/t recovery from hip sx. Has not been on meds for anx/dep in the past but thinks he may have this. He feels like [employer] is helping him stay sober but also the atmosphere is not good, he does not trust anyone there. Does have problems sleeping, is able to stay asleep but has hard time falling asleep and difficulty calming his thoughts down.      Review of Systems   Constitutional: Positive for fatigue. Negative for fever and unexpected weight loss.   Respiratory: Negative for cough, shortness of breath and wheezing.    Cardiovascular: Negative for chest pain and palpitations.   Musculoskeletal: Positive for arthralgias.   Psychiatric/Behavioral: Positive for sleep disturbance and stress. Negative for self-injury, suicidal ideas and depressed mood. The patient is nervous/anxious.        The following portions of the patient's history were reviewed and updated as appropriate: allergies, current medications, past family history, past medical history, past social history, past surgical history and problem list.  No Known Allergies  Current Outpatient Medications on File Prior to Visit   Medication Sig Dispense Refill   • aspirin 81 MG chewable tablet  "Chew 81 mg.     • naproxen (NAPRELAN) 500 MG 24 hr tablet Take 1,000 mg by mouth Daily.     • Omega-3 Fatty Acids (fish oil) 1000 MG capsule capsule Take  by mouth Daily With Breakfast.     • traMADol (ULTRAM) 50 MG tablet      • TURMERIC PO Take  by mouth.     • [DISCONTINUED] atorvastatin (LIPITOR) 40 MG tablet Take 1 tablet by mouth Daily. 30 tablet 2   • [DISCONTINUED] naproxen (NAPROSYN) 500 MG tablet        No current facility-administered medications on file prior to visit.     New Medications Ordered This Visit   Medications   • atorvastatin (LIPITOR) 10 MG tablet     Sig: Take 1 tablet by mouth Daily.     Dispense:  30 tablet     Refill:  2   • hydrOXYzine pamoate (Vistaril) 25 MG capsule     Sig: Take 1-2 capsules by mouth At Night As Needed (insomnia).     Dispense:  60 capsule     Refill:  1       Social History     Tobacco Use   Smoking Status Current Some Day Smoker   • Types: Cigarettes   Smokeless Tobacco Never Used        Objective   Vital Signs:   Vitals:    06/16/21 0957   BP: 122/78   Pulse: 75   Resp: 20   Temp: 97.5 °F (36.4 °C)   TempSrc: Temporal   SpO2: 98%   Weight: 104 kg (230 lb)   Height: 171.5 cm (67.52\")   PainSc:   4   PainLoc: Hip      Physical Exam  Vitals reviewed.   Constitutional:       General: He is not in acute distress.     Appearance: Normal appearance.   HENT:      Head: Normocephalic and atraumatic.   Eyes:      General: No scleral icterus.     Extraocular Movements: Extraocular movements intact.      Conjunctiva/sclera: Conjunctivae normal.   Cardiovascular:      Rate and Rhythm: Normal rate and regular rhythm.      Heart sounds: Normal heart sounds. No murmur heard.     Pulmonary:      Effort: Pulmonary effort is normal. No respiratory distress.      Breath sounds: Normal breath sounds. No stridor. No wheezing or rhonchi.   Musculoskeletal:      Cervical back: Normal range of motion and neck supple.   Skin:     General: Skin is warm and dry.      Coloration: Skin is not " jaundiced.   Neurological:      General: No focal deficit present.      Mental Status: He is alert and oriented to person, place, and time.      Gait: Gait normal.   Psychiatric:         Mood and Affect: Mood normal.         Behavior: Behavior normal.          Result Review :                   Assessment and Plan    Diagnoses and all orders for this visit:    1. Hyperlipidemia, unspecified hyperlipidemia type (Primary)  Assessment & Plan:  Lipid abnormalities are unchanged.  Nutritional counseling was provided. and Pharmacotherapy as ordered.  Lipids will be reassessed in 3 months. Will start low dose lipitor, f/u 3 mo, cont to work on diet/exercise.    Orders:  -     Lipid Panel; Future  -     atorvastatin (LIPITOR) 10 MG tablet; Take 1 tablet by mouth Daily.  Dispense: 30 tablet; Refill: 2    2. Anxiety  Assessment & Plan:  Worsening, vistaril prn and at night for sleep, referred for therapy.     Orders:  -     Ambulatory Referral to Psychology  -     hydrOXYzine pamoate (Vistaril) 25 MG capsule; Take 1-2 capsules by mouth At Night As Needed (insomnia).  Dispense: 60 capsule; Refill: 1    3. Fatigue, unspecified type  Assessment & Plan:  Check labs, may be related to anx/dep. Refer to therapy, work on getting good sleep, vistaril prn for insomnia.    Orders:  -     TSH Rfx On Abnormal To Free T4; Future  -     Testosterone; Future    4. Avascular necrosis (CMS/HCC)  Assessment & Plan:  Improved with surgery. F/u with [facility] as dir      5. Family history of diabetes mellitus  -     POC Glycosylated Hemoglobin (Hb A1C)        Follow Up   Return in about 3 months (around 9/16/2021).    Follow up if symptoms worsen or persist or has new or concerning symptoms, go to ER for severe symptoms.   Reviewed common medication effects and side effects and advised to report side effects immediately, the patient expressed good understanding.  Encouraged medication compliance and the importance of keeping scheduled follow up  appointments with me and any other providers  If labs or images are ordered we will contact you with the results within the next week.  If you have not heard from us after a week please call our office to inquire about the results.   Patient was given instructions and counseling regarding his condition or for health maintenance advice. Please see specific information pulled into the AVS if appropriate.     Catherine Edwards PA-C    * Please note that portions of this note may have been completed with a voice recognition program. Efforts were made to edit the dictation but occasionally words are erroneously transcribed.

## 2021-06-16 NOTE — ASSESSMENT & PLAN NOTE
Lipid abnormalities are unchanged.  Nutritional counseling was provided. and Pharmacotherapy as ordered.  Lipids will be reassessed in 3 months. Will start low dose lipitor, f/u 3 mo, cont to work on diet/exercise.

## 2021-06-16 NOTE — ASSESSMENT & PLAN NOTE
Check labs, may be related to anx/dep. Refer to therapy, work on getting good sleep, vistaril prn for insomnia.

## 2021-06-18 ENCOUNTER — TELEPHONE (OUTPATIENT)
Dept: INTERNAL MEDICINE | Facility: CLINIC | Age: 26
End: 2021-06-18

## 2021-06-18 NOTE — TELEPHONE ENCOUNTER
[facility] at [phone] for the patient to return our call, office number was provided      HUB TO READ: Please provide the patient with the below provider results. Thank you!     ----- Message from Catherine Edwards PA-C sent at 6/16/2021  8:32 PM EDT -----  Your testosterone and thyroid labs are normal. Your cholesterol however is worse, with LDL now being 30 points higher and over 200. Please start the statin and we will recheck in 2-3 months.

## 2021-06-18 NOTE — TELEPHONE ENCOUNTER
PATIENT RETURNED THE CALL FROM April I RELAYED THE HUB TO READ MESSAGE. THE PATIENT STATES THAT HE WOULD LIKE TO KNOW WHAT THE ACTUAL RESULT WAS FOR THE  TOSTERONE TEST PLEASE CALL PATIENT TO DISCUS.      CALL [phone]

## 2021-06-29 ENCOUNTER — HOSPITAL ENCOUNTER (OUTPATIENT)
Dept: NUTRITION | Facility: HOSPITAL | Age: 26
Setting detail: RECURRING SERIES
Discharge: HOME | End: 2021-06-29

## 2021-06-29 VITALS — HEIGHT: 68 IN | WEIGHT: 235 LBS | BODY MASS INDEX: 35.61 KG/M2

## 2021-06-29 PROCEDURE — 97804 MEDICAL NUTRITION GROUP: CPT

## 2021-06-29 PROCEDURE — 97802 MEDICAL NUTRITION INDIV IN: CPT

## 2021-06-29 NOTE — CONSULTS
"Adult Outpatient Nutrition  Assessment/PES    Patient Name:  Venu Bustillos  YOB: 1995  MRN: [id number]    Assessment Date:  6/29/2021    Comments:      This medical referred consult was provided via telehealth as patient was unable to attend an in-office appointment today due to the COVID-19 crisis. Consent for treatment was given verbally. RD spent a total of 90 minutes with patient today.      Patient is a 25 year old male seen today for an initial group wt loss class related to hyperlipidemia. Patient was the only person enrolled in group class. Patient shared he is seeking support to develop healthy lifestyle behaviors. Patient reported he gained about 70 lbs in 2019 and lost it all from May 2020-Fabruary 2021. Since then, he shared he has plateaued, but he has resumed previous eating habits, such as eating a lot of meat and cheese.  He stated he is motivated to make change to lose weight, lower his cholesterol, and prevent diabetes, which runs in his family. 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 He shared they receive frequent food donations from [employer] and other local grocery stores, so he seldom has to shop for groceries. Patient shared he enjoys to cook. He stated he is interested in starting a vegan diet, as he has watched several [employer] documentaries and received recommendations from his friends at the gym. Patient has several questions regarding RD recommendations. Health literacy assessment not completed prior to visit. Patient reported no barriers to learning.     Dietary Recall 6/26/21:  Breakfast: 5 eggs, 7 slices davis, 1 biscuit  Lunch: 4 hot dogs, 2 steaks, 5 chicken wings  Dinner: 1/4 water maryjane, large salad with mixed vegetables  Typical day? \"When I binge eat.\"    Dietary Recall: 6/28/21:  Breakfast: 1 scoop protein powder in water  Snack: 1 scoop metamucil in water  Snack: " "handful peanuts  Lunch: handful peanuts, 1 scoop protein powder in water, 1 scoop collagen in water, 1 scoop metamucil in water  Dinner: 1/2 large pizza   Typical day? \"When I am trying to cut back.\"    RD discussed the importance of making realistic and sustainable changes. RD shared research showing under eating and feelings of deprivation are correlated with an increase risk for binge eating. RD emphasized the importance of a balanced diet. RD discussed the importance of eating consistent, balanced meals to promote overall health, a healthy weight, and a healthy blood glucose. RD explained that meat and animal products do not have to be eliminated from the diet completely. RD shared resources on the mediterranean diet showing the benefits of moderate lean meat and fish intake in addition to high fruit, vegetable, and whole grain intake. Patient stated he feels cutting out all meat from his diet may be unrealistic, so he would like to start with a more gradual approach. RD used the plate method to discuss the components of a balanced meal. RD provided patient with several examples. Patient was able to teach back concepts discussed by suggesting for lunch today he could make ground turkey, rice, and broccoli with food he already has in his pantry. Patient stated he has been avoiding eating carbohydrates and vegetables so he could get enough protein in. He stated he has been trying to take in as much protein as possible per recommendations from people at the gym. RD assured patient that he can meet his protein intake with a moderate intake. Patient suggested several other meal and snack ideas he could prepare at home that sounded good to him. RD recommended patient avoid trying to do liquid cleanses, as this is not necessary and may increase his risk for binge eating. RD emphasiezed the importance of meeting fiber needs (38 g/day). RD recommended patient aim to eat 600-660 calories per meal to promote safe weight " "loss (assessed via 14-20 kcals/kg). We also discussed the importance of limiting saturated fat and added sugars in the diet to promote healthy lipid levels and a healthy blood glucose.  Patient and RD also reviewed balanced snack ideas. RD received permission to send recipes and handouts to patient. RD encouraged patient to contact RD with any needs prior to scheduled follow up visit.     Goals:  1) Eat 600-660 calories per meal.  2) Follow plate method for 2 meals per day, 5 days per week.       Total of 90 minutes spent with patient on nutrition counseling. Education based on Academy of Dietetics and Nutrition guidelines. Patient was provided with RD's contact information. Follow up visit is scheduled for 8/3/21 at 9:15 am.  Thank you for this referral.    General Info     Row Name 06/29/21 1536       Today's Session    Person(s) attending today's session  Patient     Services Used Today?  No       General Information    How Well Do You Speak English?  very well    Preferred Language  English    Are you able to read and write English?  Yes    Lives With  other (see comments) 2 roommates          Anthropometrics     Row Name 06/29/21 1543 06/29/21 1538       Anthropometrics    Height  171.5 cm (67.52\")  171.5 cm (67.52\")    Weight  --  107 kg (235 lb)       Ideal Body Weight (IBW)    Ideal Body Weight (IBW) (kg)  69.55  69.55    % Ideal Body Weight  --  153.26       Body Mass Index (BMI)    BMI (kg/m2)  --  36.32        Nutritional Info/Activity     Row Name 06/29/21 2926       Nutritional Information    Have you had weight changes?  Yes    Describe weight changes  Patient stated he gained 70 lbs in 2019 and lost the 70 lbs from May 2020 to February 2021.    What is your desired body weight?  83.9 kg (185 lb)    Have you tried to lose weight before?  Yes    What is your motivation to lose weight?  Health, lower cholesterol, reduce risk for DM    Food Allergies  -- none    Supplemental " "Drinks/Foods/Additives  Off an on - MVI, pre-workout, tumeric; Consistenly - fish oil    History of eating disorder?  No    What cultural diet influences are important for you to follow?  none    Do you have difficulty chewing food?  No    Functional Status  able to prepare meals;able to purchase food    List any food cravings/trigger foods you have  meat, cheese    How often do you eat out and where?  5 days per week - burrito at Mexican restaurant after working out    Do you use Food Assistance programs (WIC, food stamps, food bank)?  yes    Do you need information about Food Assistance programs?  no    What is the biggest challenge you have with your diet?  Knowledge    What type of support do you currently use to help you with your health issues?  friends from the gym and roomates       Eating Environment    Eating environment  Alone       Physical Activity    Are you currently involved in an activity/exercise program?   Yes    Describe physical activity  Weight lifting and cardio 5-7 days per week    How many minutes do you spend on exercise each day?  60    How would you rank exercise as an important health lifestyle practice?  10        Home Nutrition Report     Row Name 06/29/21 1537          Home Nutrition Report    Supplemental Drinks/Foods/Additives  Off an on - MVI, pre-workout, tumeric; Consistenly - fish oil         Estimated/Assessed Needs     Row Name 06/29/21 1543 06/29/21 1537       Calculation Measurements    Weight Used For Calculations  107 kg (235 lb 14.3 oz)  --    Height  171.5 cm (67.52\")  171.5 cm (67.52\")       Estimated/Assessed Needs    Additional Documentation  14-20 calories per kg 4587-3077 calories per day                                   Labs/Tests/Procedures/Meds     Row Name 06/29/21 1544          Labs/Procedures/Meds    Lab Results Reviewed  reviewed     Lab Results Comments  (6/16/21): Cholesterol - 291 mg/dL; LDL Cholesterol: 227 mg/dL     "         Problem/Interventions:  Problem 1     Row Name 06/29/21 1558          Nutrition Diagnoses Problem 1    Problem 1  -- Hyperlipidemia     Etiology (related to)  -- lifestyle     Signs/Symptoms (evidenced by)  -- 6/16/21 - Cholesterol 291 mg/dL and LDL cholesterol 227 mg/dL                 Intervention Goal     Row Name 06/29/21 1559          Intervention Goal    General  Meet nutritional needs for age/condition     Weight  Appropriate weight loss             Education/Evaluation     Row Name 06/29/21 1559          Education    Education  Advised regarding habits/behavior     Advised Regarding Habits/Behavior  Snacks;Appropriate portions;Increased nutrient density;Use supplement;Label reading;Eating out;Eating pattern;Meal planning;Food choices;Food prep        Monitor/Evaluation    Monitor  PO intake;Weight     Education Follow-up  Reinforce PRN           Electronically signed by:  Gela Kumar RD  06/29/21 16:01 EDT

## 2021-06-30 NOTE — ADDENDUM NOTE
Encounter addended by: Gela Kumar RD on: 6/30/2021 9:52 AM   Actions taken: Charge Capture section accepted, Clinical Note Signed

## 2021-06-30 NOTE — ADDENDUM NOTE
Encounter addended by: Gela Kumar RD on: 6/30/2021 9:48 AM   Actions taken: Pend clinical note, Clinical Note Signed

## 2021-08-03 ENCOUNTER — HOSPITAL ENCOUNTER (OUTPATIENT)
Dept: NUTRITION | Facility: HOSPITAL | Age: 26
Setting detail: RECURRING SERIES
Discharge: HOME | End: 2021-08-03

## 2021-08-03 NOTE — PROGRESS NOTES
Adult Outpatient Nutrition    Patient Name:  Venu Bustillos  YOB: 1995  MRN: [id number]    Assessment Date:  8/3/2021    Comments:         This medical referred consult was provided via zoom as patient was unable to attend an in-office appointment today due to the COVID-19 crisis. Consent for treatment was given verbally. RD spent a total of 30 minutes with patient today.      Patient is a 25 year old male seen today for a nutrition follow up visit. Patient shared he has been doing well since his initial nutrition session. He reported he has been more mindful of what he is eating. He shared he has made a conscious effort to increase his consumption of whole grains. He reported he has seen success in limiting his intake of red meat and pork. He stated he has been eating impossible burgers, chicken, and fish a majority of the time. He reported he has been aiming to have 600 calories per meal with a snack in-between his meals. Patient reported the biggest challenge he has faced is increasing his fruit and vegetable consumption. He shared he has access to several fruits and vegetables, but he has not had an appetite for them recently. Patient reported he has been going to the gym 2-3 hours 5-7 days per week. He shared he is happy with his progress as he has not gained any weight the past month. Patient reported he has also been attempting to drink 2 gallons of water daily. Patient stated he has no specific questions or topics he would like to discuss today.     24-Hour Dietary Recall:  6:00 am: impossible plant based burger on a multi-grain bun with MVI  8:00 am: 2 handfuls of peanuts  10:00 am: 2 handfuls of granola, 1 scoop collagen peptides, 1 scoop pre-workout  2:30 pm: two impossible burgers one one multi-grain bun, 4 oz greek yogurt  4:00 pm: handful of nuts  6:00 pm: chicken TV dinner  8:00 pm: 1 cup raisin brand cereal     Per dietary recall and interview, patient's diet is low in fruits,  vegetables, low fat dairy products, and essential fatty acids. He is consistently eating sources of protein, nuts, and whole grains. RD encouraged patient with the progress he has made thus far. He is better meeting his needs overall. During our last visit, patient would skip meals and eat large amounts at his meals. We discussed strategies to increase his fruit and vegetable intake. He stated he would like to try cooking his vegetables with the meals he is already making. For example, he suggested he could cook peppers and onions in his taco filling. He also suggested he could add chopped vegetables into the soups and salads he makes. RD emphasized the importance of avoiding excessive water intake. We discussed risk for over hydration. RD recommended patient avoid drinking over one gallon of water per day. Patient stated he feels good about his progress and his next steps. Patient stated he is not interested in scheduling a follow up nutrition visit at this time. RD encouraged patient to contact RD with any future needs. Patient confirmed he still has RD's contact information.       Goal Completion:  1) Eat 600-660 calories per meal - 100%  2) Follow plate method for 2 meals per day, 5 days per week - 25%        Total of 30 minutes spent with patient on nutrition counseling. Education based on Academy of Dietetics and Nutrition guidelines. Patient was provided with RD's contact information. Thank you for this referral.         Electronically signed by:  Gela Kumar RD  08/03/21 09:40 EDT

## 2021-09-16 ENCOUNTER — LAB (OUTPATIENT)
Dept: LAB | Facility: HOSPITAL | Age: 26
End: 2021-09-16

## 2021-09-16 ENCOUNTER — OFFICE VISIT (OUTPATIENT)
Dept: INTERNAL MEDICINE | Facility: CLINIC | Age: 26
End: 2021-09-16

## 2021-09-16 VITALS
WEIGHT: 241 LBS | OXYGEN SATURATION: 97 % | TEMPERATURE: 97.1 F | DIASTOLIC BLOOD PRESSURE: 80 MMHG | HEIGHT: 68 IN | BODY MASS INDEX: 36.53 KG/M2 | RESPIRATION RATE: 20 BRPM | SYSTOLIC BLOOD PRESSURE: 122 MMHG | HEART RATE: 76 BPM

## 2021-09-16 DIAGNOSIS — E78.00 PURE HYPERCHOLESTEROLEMIA: ICD-10-CM

## 2021-09-16 DIAGNOSIS — E78.00 PURE HYPERCHOLESTEROLEMIA: Primary | ICD-10-CM

## 2021-09-16 DIAGNOSIS — G47.00 INSOMNIA, UNSPECIFIED TYPE: ICD-10-CM

## 2021-09-16 DIAGNOSIS — F41.9 ANXIETY: ICD-10-CM

## 2021-09-16 DIAGNOSIS — M87.00 AVASCULAR NECROSIS (HCC): ICD-10-CM

## 2021-09-16 LAB
ALBUMIN SERPL-MCNC: 4.8 G/DL (ref 3.5–5.2)
ALBUMIN/GLOB SERPL: 2 G/DL
ALP SERPL-CCNC: 53 U/L (ref 39–117)
ALT SERPL W P-5'-P-CCNC: 25 U/L (ref 1–41)
ANION GAP SERPL CALCULATED.3IONS-SCNC: 9.1 MMOL/L (ref 5–15)
AST SERPL-CCNC: 28 U/L (ref 1–40)
BILIRUB SERPL-MCNC: 0.3 MG/DL (ref 0–1.2)
BUN SERPL-MCNC: 13 MG/DL (ref 6–20)
BUN/CREAT SERPL: 14.1 (ref 7–25)
CALCIUM SPEC-SCNC: 9.8 MG/DL (ref 8.6–10.5)
CHLORIDE SERPL-SCNC: 103 MMOL/L (ref 98–107)
CHOLEST SERPL-MCNC: 186 MG/DL (ref 0–200)
CO2 SERPL-SCNC: 26.9 MMOL/L (ref 22–29)
CREAT SERPL-MCNC: 0.92 MG/DL (ref 0.76–1.27)
GFR SERPL CREATININE-BSD FRML MDRD: 99 ML/MIN/1.73
GLOBULIN UR ELPH-MCNC: 2.4 GM/DL
GLUCOSE SERPL-MCNC: 77 MG/DL (ref 65–99)
HDLC SERPL-MCNC: 56 MG/DL (ref 40–60)
LDLC SERPL CALC-MCNC: 117 MG/DL (ref 0–100)
LDLC/HDLC SERPL: 2.07 {RATIO}
POTASSIUM SERPL-SCNC: 4.3 MMOL/L (ref 3.5–5.2)
PROT SERPL-MCNC: 7.2 G/DL (ref 6–8.5)
SODIUM SERPL-SCNC: 139 MMOL/L (ref 136–145)
TRIGL SERPL-MCNC: 71 MG/DL (ref 0–150)
VLDLC SERPL-MCNC: 13 MG/DL (ref 5–40)

## 2021-09-16 PROCEDURE — 80053 COMPREHEN METABOLIC PANEL: CPT | Performed by: PHYSICIAN ASSISTANT

## 2021-09-16 PROCEDURE — 99214 OFFICE O/P EST MOD 30 MIN: CPT | Performed by: PHYSICIAN ASSISTANT

## 2021-09-16 PROCEDURE — 80061 LIPID PANEL: CPT | Performed by: PHYSICIAN ASSISTANT

## 2021-09-16 NOTE — ASSESSMENT & PLAN NOTE
Lipid abnormalities are improving with treatment.  Nutritional counseling was provided. and Pharmacotherapy as ordered.  Lipids will be reassessed in 3 months. Check lipids today, is fasting.

## 2021-09-16 NOTE — PROGRESS NOTES
Chief Complaint  Hyperlipidemia and Anxiety    Subjective          History of Present Illness  Venu Bustillos presents to [facility] for   HLD:  Has been on Lipitor 10mg for the last 3 months d/t very high LDL. He has a hx of elevated cholesterol since teenage years but never took meds for it. Had not been able to manage with diet/exercise alone. Has slacked off some on diet and has gained 10 lbs in the last visit.     AVN:  Had right hip surgery earlier this year, started having pain in his hip in 2019. Saw ortho Dr Estrella at  for this surgery. Hip pain is improved some. Has f/u in Oct, just had imaging and f/u in July. They are considering another surgery as well.     Anxiety:  Started new job recently, no longer living where he was at the [employer]. He felt like his anxiety was environmental and he is feeling much better. No longer having trouble sleeping. Staying sober, no relapse, hx of alcohol abuse. Working out helps as well but he has trouble with that due to recent hip problems.        Review of Systems   Constitutional: Negative for fever and unexpected weight loss.   Respiratory: Negative for cough, shortness of breath and wheezing.    Cardiovascular: Negative for chest pain and palpitations.   Musculoskeletal: Positive for arthralgias. Negative for joint swelling and myalgias.       The following portions of the patient's history were reviewed and updated as appropriate: allergies, current medications, past family history, past medical history, past social history, past surgical history and problem list.  No Known Allergies  Current Outpatient Medications on File Prior to Visit   Medication Sig Dispense Refill   • atorvastatin (LIPITOR) 10 MG tablet Take 1 tablet by mouth Daily. 30 tablet 2   • naproxen (NAPRELAN) 500 MG 24 hr tablet Take 1,000 mg by mouth Daily.     • Omega-3 Fatty Acids (fish oil) 1000 MG capsule capsule Take  by mouth Daily With Breakfast.  "    • aspirin 81 MG chewable tablet Chew 81 mg.     • hydrOXYzine pamoate (Vistaril) 25 MG capsule Take 1-2 capsules by mouth At Night As Needed (insomnia). 60 capsule 1   • [DISCONTINUED] traMADol (ULTRAM) 50 MG tablet      • [DISCONTINUED] TURMERIC PO Take  by mouth.       No current facility-administered medications on file prior to visit.       Social History     Tobacco Use   Smoking Status Current Some Day Smoker   • Types: Cigarettes   Smokeless Tobacco Never Used        Objective   Vital Signs:   Vitals:    09/16/21 0942   BP: 122/80   Pulse: 76   Resp: 20   Temp: 97.1 °F (36.2 °C)   TempSrc: Temporal   SpO2: 97%   Weight: 109 kg (241 lb)   Height: 171.5 cm (67.52\")   PainSc:   5   PainLoc: Hip      Physical Exam  Vitals reviewed.   Constitutional:       General: He is not in acute distress.     Appearance: Normal appearance.   HENT:      Head: Normocephalic and atraumatic.   Eyes:      General: No scleral icterus.     Extraocular Movements: Extraocular movements intact.      Conjunctiva/sclera: Conjunctivae normal.   Cardiovascular:      Rate and Rhythm: Normal rate and regular rhythm.      Heart sounds: Normal heart sounds. No murmur heard.     Pulmonary:      Effort: Pulmonary effort is normal. No respiratory distress.      Breath sounds: Normal breath sounds. No stridor. No wheezing or rhonchi.   Musculoskeletal:      Cervical back: Normal range of motion and neck supple.   Skin:     General: Skin is warm and dry.      Coloration: Skin is not jaundiced.   Neurological:      General: No focal deficit present.      Mental Status: He is alert and oriented to person, place, and time.      Gait: Gait normal.   Psychiatric:         Mood and Affect: Mood normal.         Behavior: Behavior normal.          Result Review :                No orders of the defined types were placed in this encounter.         Assessment and Plan    Diagnoses and all orders for this visit:    1. Pure hypercholesterolemia " (Primary)  Assessment & Plan:  Lipid abnormalities are improving with treatment.  Nutritional counseling was provided. and Pharmacotherapy as ordered.  Lipids will be reassessed in 3 months. Check lipids today, is fasting.     Orders:  -     Lipid Panel; Future  -     Comprehensive Metabolic Panel; Future    2. Avascular necrosis (CMS/HCC)  Assessment & Plan:  Chronic, improved. F/u with [facility] as directed      3. Anxiety  Assessment & Plan:  Chronic, Improved with lifestyle changes. Cont exercise regularly, getting good sleep      4. Insomnia, unspecified type  Assessment & Plan:  Chronic, improved, vistaril 25mg prn.        Follow Up   Return in about 3 months (around 12/16/2021).    Follow up if symptoms worsen or persist or has new or concerning symptoms, go to ER for severe symptoms.   Reviewed common medication effects and side effects and advised to report side effects immediately, the patient expressed good understanding.  Encouraged medication compliance and the importance of keeping scheduled follow up appointments with me and any other providers  If labs or images are ordered we will contact you with the results within the next week.  If you have not heard from us after a week please call our office to inquire about the results.   Patient was given instructions and counseling regarding his condition or for health maintenance advice. Please see specific information pulled into the AVS if appropriate.     Catherine Edwards PA-C    * Please note that portions of this note may have been completed with a voice recognition program. Efforts were made to edit the dictation but occasionally words are erroneously transcribed.

## 2021-09-17 ENCOUNTER — TELEPHONE (OUTPATIENT)
Dept: INTERNAL MEDICINE | Facility: CLINIC | Age: 26
End: 2021-09-17

## 2021-09-17 DIAGNOSIS — E78.5 HYPERLIPIDEMIA, UNSPECIFIED HYPERLIPIDEMIA TYPE: ICD-10-CM

## 2021-09-17 RX ORDER — ATORVASTATIN CALCIUM 10 MG/1
10 TABLET, FILM COATED ORAL DAILY
Qty: 90 TABLET | Refills: 1 | Status: SHIPPED | OUTPATIENT
Start: 2021-09-17 | End: 2021-12-14 | Stop reason: SDUPTHER

## 2021-09-17 NOTE — TELEPHONE ENCOUNTER
----- Message from Catherine Edwards PA-C sent at 9/17/2021  8:51 AM EDT -----  Your cholesterol is significantly improved! It has been decreased by half with the lipitor so we will keep you on this dose. I sent in 90 day supply with refill.

## 2021-12-14 ENCOUNTER — OFFICE VISIT (OUTPATIENT)
Dept: INTERNAL MEDICINE | Facility: CLINIC | Age: 26
End: 2021-12-14

## 2021-12-14 ENCOUNTER — HOSPITAL ENCOUNTER (OUTPATIENT)
Dept: GENERAL RADIOLOGY | Facility: HOSPITAL | Age: 26
Discharge: HOME | End: 2021-12-14
Admitting: PHYSICIAN ASSISTANT

## 2021-12-14 VITALS
OXYGEN SATURATION: 98 % | HEIGHT: 68 IN | DIASTOLIC BLOOD PRESSURE: 84 MMHG | WEIGHT: 250 LBS | RESPIRATION RATE: 20 BRPM | TEMPERATURE: 97.1 F | SYSTOLIC BLOOD PRESSURE: 128 MMHG | HEART RATE: 81 BPM | BODY MASS INDEX: 37.89 KG/M2

## 2021-12-14 DIAGNOSIS — F41.9 ANXIETY: ICD-10-CM

## 2021-12-14 DIAGNOSIS — M16.11 OSTEOARTHRITIS OF RIGHT HIP, UNSPECIFIED OSTEOARTHRITIS TYPE: ICD-10-CM

## 2021-12-14 DIAGNOSIS — M79.672 LEFT FOOT PAIN: ICD-10-CM

## 2021-12-14 DIAGNOSIS — M79.672 LEFT FOOT PAIN: Primary | ICD-10-CM

## 2021-12-14 DIAGNOSIS — E78.5 HYPERLIPIDEMIA, UNSPECIFIED HYPERLIPIDEMIA TYPE: ICD-10-CM

## 2021-12-14 DIAGNOSIS — G47.00 INSOMNIA, UNSPECIFIED TYPE: ICD-10-CM

## 2021-12-14 PROCEDURE — 99214 OFFICE O/P EST MOD 30 MIN: CPT | Performed by: PHYSICIAN ASSISTANT

## 2021-12-14 PROCEDURE — 73630 X-RAY EXAM OF FOOT: CPT

## 2021-12-14 RX ORDER — DICLOFENAC SODIUM 75 MG/1
75 TABLET, DELAYED RELEASE ORAL 2 TIMES DAILY
Qty: 60 TABLET | Refills: 2 | Status: SHIPPED | OUTPATIENT
Start: 2021-12-14

## 2021-12-14 RX ORDER — ATORVASTATIN CALCIUM 10 MG/1
10 TABLET, FILM COATED ORAL DAILY
Qty: 90 TABLET | Refills: 1 | Status: SHIPPED | OUTPATIENT
Start: 2021-12-14 | End: 2022-03-31 | Stop reason: SDUPTHER

## 2021-12-14 NOTE — PROGRESS NOTES
Chief Complaint  Familial Hypercholesterolemia, Avascular Necrosis , and Foot Pain    Subjective          History of Present Illness  Venu Larson presents to [facility] for   HLD:  Doing well on lipitor, last cholesterol check 3 mo ago looked good (LDL decreased by 50%)    Right AVN:  Most recent f/u was November. Is having surgery in January now to reshape femoral head and repair labrum. Motrin and Naproxen have not helped the pain.   Had right hip surgery earlier this year, started having pain in his hip in 2019. Saw ortho Dr Estrella at  for this surgery. Hip pain is improved some.    Anxiety/Insomnia:  Taking vistaril prn for insomnia and anxiety.   No longer living where he was at the [facility]. He felt like his anxiety was environmental and he is feeling much better. Staying sober, no relapse, hx of alcohol abuse. Working out helps as well but he has trouble with that due to recent hip problems.    Left foot pain:  Twisted his left ankle yesterday while walking down a hill. He did cont to walk afterwards and then after he rested for a little the pain has been more. The area is not swollen or bruised but has a lot of pain so would like xray. The foot is hurting, it is not just the ankle. The lateral part of his foot is painful.       Foot Pain  Associated symptoms include arthralgias. Pertinent negatives include no chest pain, coughing, fever, joint swelling or myalgias.       Review of Systems   Constitutional: Negative for fever and unexpected weight loss.   Respiratory: Negative for cough, shortness of breath and wheezing.    Cardiovascular: Negative for chest pain and palpitations.   Musculoskeletal: Positive for arthralgias. Negative for joint swelling and myalgias.       The following portions of the patient's history were reviewed and updated as appropriate: allergies, current medications, past family history, past medical history, past social history, past  "surgical history and problem list.  No Known Allergies  Current Outpatient Medications on File Prior to Visit   Medication Sig Dispense Refill   • hydrOXYzine pamoate (Vistaril) 25 MG capsule Take 1-2 capsules by mouth At Night As Needed (insomnia). 60 capsule 1   • [DISCONTINUED] atorvastatin (LIPITOR) 10 MG tablet Take 1 tablet by mouth Daily. 90 tablet 1   • Omega-3 Fatty Acids (fish oil) 1000 MG capsule capsule Take  by mouth Daily With Breakfast.     • [DISCONTINUED] aspirin 81 MG chewable tablet Chew 81 mg.     • [DISCONTINUED] naproxen (NAPRELAN) 500 MG 24 hr tablet Take 1,000 mg by mouth Daily.       No current facility-administered medications on file prior to visit.       Social History     Tobacco Use   Smoking Status Current Some Day Smoker   • Types: Cigarettes   Smokeless Tobacco Never Used        Objective   Vital Signs:   Vitals:    12/14/21 1008   BP: 128/84   Pulse: 81   Resp: 20   Temp: 97.1 °F (36.2 °C)   TempSrc: Temporal   SpO2: 98%   Weight: 113 kg (250 lb)   Height: 171.5 cm (67.52\")   PainSc: 10-Worst pain ever   PainLoc: Foot  Comment: left foot      Physical Exam  Vitals reviewed.   Constitutional:       General: He is not in acute distress.     Appearance: Normal appearance.   HENT:      Head: Normocephalic and atraumatic.   Eyes:      General: No scleral icterus.     Extraocular Movements: Extraocular movements intact.      Conjunctiva/sclera: Conjunctivae normal.   Cardiovascular:      Rate and Rhythm: Normal rate and regular rhythm.      Heart sounds: Normal heart sounds. No murmur heard.      Pulmonary:      Effort: Pulmonary effort is normal. No respiratory distress.      Breath sounds: Normal breath sounds. No stridor. No wheezing or rhonchi.   Musculoskeletal:         General: Tenderness (mild left lateral foot tenderness) present. Normal range of motion.      Cervical back: Normal range of motion and neck supple.   Skin:     General: Skin is warm and dry.      Coloration: Skin is " not jaundiced.   Neurological:      General: No focal deficit present.      Mental Status: He is alert and oriented to person, place, and time.      Gait: Gait normal.   Psychiatric:         Mood and Affect: Mood normal.         Behavior: Behavior normal.          Result Review :                New Medications Ordered This Visit   Medications   • atorvastatin (LIPITOR) 10 MG tablet     Sig: Take 1 tablet by mouth Daily.     Dispense:  90 tablet     Refill:  1   • diclofenac (VOLTAREN) 75 MG EC tablet     Sig: Take 1 tablet by mouth 2 (Two) Times a Day.     Dispense:  60 tablet     Refill:  2          Assessment and Plan    Diagnoses and all orders for this visit:    1. Left foot pain (Primary)  Assessment & Plan:  Xray ordered    Orders:  -     XR Foot 3+ View Left; Future    2. Hyperlipidemia, unspecified hyperlipidemia type  Assessment & Plan:  Lipid abnormalities are improving with treatment.  Nutritional counseling was provided.  Lipids will be reassessed in 3 months.    Orders:  -     atorvastatin (LIPITOR) 10 MG tablet; Take 1 tablet by mouth Daily.  Dispense: 90 tablet; Refill: 1    3. Anxiety  Assessment & Plan:  Cont prn vistaril      4. Osteoarthritis of right hip, unspecified osteoarthritis type  Assessment & Plan:  F/u with [facility], try diclofenac for pain    Orders:  -     diclofenac (VOLTAREN) 75 MG EC tablet; Take 1 tablet by mouth 2 (Two) Times a Day.  Dispense: 60 tablet; Refill: 2    5. Insomnia, unspecified type  Assessment & Plan:  Chronic, improved, cont vistaril prn        Follow Up   Return in about 3 months (around 3/14/2022). for repeat labs for lipids, f/u on hip    Follow up if symptoms worsen or persist or has new or concerning symptoms, go to ER for severe symptoms.   Reviewed common medication effects and side effects and advised to report side effects immediately, the patient expressed good understanding.  Encouraged medication compliance and the importance of keeping scheduled  follow up appointments with me and any other providers  If labs or images are ordered we will contact you with the results within the next week.  If you have not heard from us after a week please call our office to inquire about the results.   Patient was given instructions and counseling regarding his condition or for health maintenance advice. Please see specific information pulled into the AVS if appropriate.     Catherine Edwards PA-C    * Please note that portions of this note may have been completed with a voice recognition program. Efforts were made to edit the dictation but occasionally words are erroneously transcribed.

## 2022-03-31 ENCOUNTER — OFFICE VISIT (OUTPATIENT)
Dept: INTERNAL MEDICINE | Facility: CLINIC | Age: 27
End: 2022-03-31

## 2022-03-31 VITALS
SYSTOLIC BLOOD PRESSURE: 122 MMHG | WEIGHT: 226 LBS | HEIGHT: 68 IN | HEART RATE: 74 BPM | BODY MASS INDEX: 34.25 KG/M2 | OXYGEN SATURATION: 99 % | TEMPERATURE: 96.9 F | RESPIRATION RATE: 20 BRPM | DIASTOLIC BLOOD PRESSURE: 84 MMHG

## 2022-03-31 DIAGNOSIS — E78.5 HYPERLIPIDEMIA, UNSPECIFIED HYPERLIPIDEMIA TYPE: Primary | ICD-10-CM

## 2022-03-31 DIAGNOSIS — J30.9 ALLERGIC RHINITIS, UNSPECIFIED SEASONALITY, UNSPECIFIED TRIGGER: ICD-10-CM

## 2022-03-31 DIAGNOSIS — R06.2 WHEEZE: ICD-10-CM

## 2022-03-31 DIAGNOSIS — F41.9 ANXIETY: ICD-10-CM

## 2022-03-31 PROCEDURE — 99214 OFFICE O/P EST MOD 30 MIN: CPT | Performed by: PHYSICIAN ASSISTANT

## 2022-03-31 RX ORDER — TRAMADOL HYDROCHLORIDE 50 MG/1
50 TABLET ORAL
COMMUNITY
Start: 2022-03-07 | End: 2022-06-21

## 2022-03-31 RX ORDER — ATORVASTATIN CALCIUM 10 MG/1
10 TABLET, FILM COATED ORAL DAILY
Qty: 90 TABLET | Refills: 1 | Status: SHIPPED | OUTPATIENT
Start: 2022-03-31 | End: 2022-06-21 | Stop reason: SDUPTHER

## 2022-03-31 RX ORDER — ALBUTEROL SULFATE 90 UG/1
2 AEROSOL, METERED RESPIRATORY (INHALATION) EVERY 4 HOURS PRN
Qty: 18 G | Refills: 0 | Status: SHIPPED | OUTPATIENT
Start: 2022-03-31

## 2022-03-31 RX ORDER — NAPROXEN 500 MG/1
500 TABLET ORAL
COMMUNITY
Start: 2022-03-07 | End: 2022-04-06

## 2022-03-31 RX ORDER — LORATADINE 10 MG/1
10 TABLET ORAL DAILY
Qty: 30 TABLET | Refills: 1 | Status: SHIPPED | OUTPATIENT
Start: 2022-03-31

## 2022-03-31 NOTE — PROGRESS NOTES
Chief Complaint  Hyperlipidemia, Anxiety, and Post COVID symptoms     Subjective          History of Present Illness  Venu Larson presents to [facility] for   HLD:  Doing well on lipitor, last cholesterol check 3 mo ago looked good (LDL decreased by 50%). He has been off his cholesterol med for the last month because he ran out. He has been working on diet and exercise and lost 25 lbs in the last 3 months.   Lab Results       Component                Value               Date                       CHOL                     186                 09/16/2021                 TRIG                     71                  09/16/2021                 HDL                      56                  09/16/2021                 LDL                      117 (H)             09/16/2021              Right AVN:  Had recent surgery in January now to reshape femoral head and repair labrum. Has naproxen or diclofenac to take prn for pain. Also was given tramadol after his surgery.  Had right hip surgery earlier this year, started having pain in his hip in 2019. Saw ortho Dr Estrella at  for this surgery. Hip pain has improved since surgery but he was told he may need a hip replacement.     Anxiety/Insomnia:  Has vistaril to take prn for insomnia and anxiety. Has not needed it lately.  No longer living where he was at the [facility]. He felt like his anxiety was environmental and he is feeling much better. Staying sober, no relapse, hx of alcohol abuse. Working out helps as well but he has trouble with that due to recent hip problems.    Post Covid:  Had covid in January and since then has had a wheeze here and there. Has used his moms inhaler and helps. Has occ cough that is worse at night or in the morning and sometimes triggered with eating. Cough is not getting worse, does have allergies. No cough with activity.     Umbilical Hernia:  Has had for years, it is not visible now as he has lost some  "weight.      Review of Systems   Constitutional: Negative for fever and unexpected weight loss.   Respiratory: Negative for cough, shortness of breath and wheezing.    Cardiovascular: Negative for chest pain and palpitations.       The following portions of the patient's history were reviewed and updated as appropriate: allergies, current medications, past family history, past medical history, past social history, past surgical history and problem list.  No Known Allergies  Current Outpatient Medications on File Prior to Visit   Medication Sig Dispense Refill   • naproxen (NAPROSYN) 500 MG tablet Take 500 mg by mouth.     • traMADol (ULTRAM) 50 MG tablet Take 50 mg by mouth.     • diclofenac (VOLTAREN) 75 MG EC tablet Take 1 tablet by mouth 2 (Two) Times a Day. 60 tablet 2   • hydrOXYzine pamoate (Vistaril) 25 MG capsule Take 1-2 capsules by mouth At Night As Needed (insomnia). 60 capsule 1   • Omega-3 Fatty Acids (fish oil) 1000 MG capsule capsule Take  by mouth Daily With Breakfast.     • [DISCONTINUED] atorvastatin (LIPITOR) 10 MG tablet Take 1 tablet by mouth Daily. 90 tablet 1     No current facility-administered medications on file prior to visit.     New Medications Ordered This Visit   Medications   • atorvastatin (LIPITOR) 10 MG tablet     Sig: Take 1 tablet by mouth Daily.     Dispense:  90 tablet     Refill:  1   • albuterol sulfate  (90 Base) MCG/ACT inhaler     Sig: Inhale 2 puffs Every 4 (Four) Hours As Needed for Wheezing.     Dispense:  18 g     Refill:  0   • loratadine (Claritin) 10 MG tablet     Sig: Take 1 tablet by mouth Daily.     Dispense:  30 tablet     Refill:  1     Social History     Tobacco Use   Smoking Status Former Smoker   • Types: Cigarettes   Smokeless Tobacco Never Used        Objective   Vital Signs:   Vitals:    03/31/22 1140   BP: 122/84   Pulse: 74   Resp: 20   Temp: 96.9 °F (36.1 °C)   TempSrc: Temporal   SpO2: 99%   Weight: 103 kg (226 lb)   Height: 171.5 cm (67.52\") "   PainSc:   6   PainLoc: Leg      Physical Exam  Vitals reviewed.   Constitutional:       General: He is not in acute distress.     Appearance: Normal appearance.   HENT:      Head: Normocephalic and atraumatic.   Eyes:      General: No scleral icterus.     Extraocular Movements: Extraocular movements intact.      Conjunctiva/sclera: Conjunctivae normal.   Cardiovascular:      Rate and Rhythm: Normal rate and regular rhythm.      Heart sounds: Normal heart sounds. No murmur heard.  Pulmonary:      Effort: Pulmonary effort is normal. No respiratory distress.      Breath sounds: Normal breath sounds. No stridor. No wheezing or rhonchi.   Abdominal:      General: Bowel sounds are normal.      Palpations: Abdomen is soft.      Comments: No umbilical hernia noted at this time.   Musculoskeletal:      Cervical back: Normal range of motion and neck supple.   Skin:     General: Skin is warm and dry.      Coloration: Skin is not jaundiced.   Neurological:      General: No focal deficit present.      Mental Status: He is alert and oriented to person, place, and time.      Gait: Gait normal.   Psychiatric:         Mood and Affect: Mood normal.         Behavior: Behavior normal.        No LMP for male patient.    Result Review :                   Assessment and Plan    Diagnoses and all orders for this visit:    1. Hyperlipidemia, unspecified hyperlipidemia type (Primary)  Assessment & Plan:  Lipid abnormalities are improving with treatment.  Nutritional counseling was provided. and Pharmacotherapy as ordered.  Lipids will be reassessed in 3 months.     Orders:  -     atorvastatin (LIPITOR) 10 MG tablet; Take 1 tablet by mouth Daily.  Dispense: 90 tablet; Refill: 1    2. Wheeze  Assessment & Plan:  Albuterol prn, f/u if sx persist or worsen, may be related to recent covid infection    Orders:  -     albuterol sulfate  (90 Base) MCG/ACT inhaler; Inhale 2 puffs Every 4 (Four) Hours As Needed for Wheezing.  Dispense: 18 g;  Refill: 0    3. Allergic rhinitis, unspecified seasonality, unspecified trigger  -     loratadine (Claritin) 10 MG tablet; Take 1 tablet by mouth Daily.  Dispense: 30 tablet; Refill: 1    4. Anxiety  Assessment & Plan:  Chronic, stable, cont prn vistaril.         Follow Up   Return in about 3 months (around 6/30/2022) for Annual.    Follow up if symptoms worsen or persist or has new or concerning symptoms, go to ER for severe symptoms.   Reviewed common medication effects and side effects and advised to report side effects immediately.  Encouraged medication compliance and the importance of keeping scheduled follow up appointments with me and any other providers.  If a referral was made please contact our office if you have not heard about an appointment in the next 2 weeks.   If labs or images are ordered we will contact you with the results within the next week.  If you have not heard from us after a week please call our office to inquire about the results.   Patient was given instructions and counseling regarding his condition or for health maintenance advice. Please see specific information pulled into the AVS if appropriate.     Catherine Edwards PA-C    * Please note that portions of this note were completed with a voice recognition program.

## 2022-03-31 NOTE — ASSESSMENT & PLAN NOTE
Lipid abnormalities are improving with treatment.  Nutritional counseling was provided. and Pharmacotherapy as ordered.  Lipids will be reassessed in 3 months.

## 2022-06-21 ENCOUNTER — OFFICE VISIT (OUTPATIENT)
Dept: INTERNAL MEDICINE | Facility: CLINIC | Age: 27
End: 2022-06-21

## 2022-06-21 ENCOUNTER — LAB (OUTPATIENT)
Dept: LAB | Facility: HOSPITAL | Age: 27
End: 2022-06-21

## 2022-06-21 VITALS
WEIGHT: 218 LBS | OXYGEN SATURATION: 98 % | DIASTOLIC BLOOD PRESSURE: 78 MMHG | BODY MASS INDEX: 33.04 KG/M2 | HEIGHT: 68 IN | SYSTOLIC BLOOD PRESSURE: 124 MMHG | TEMPERATURE: 97.1 F | HEART RATE: 68 BPM | RESPIRATION RATE: 20 BRPM

## 2022-06-21 DIAGNOSIS — E78.00 PURE HYPERCHOLESTEROLEMIA: ICD-10-CM

## 2022-06-21 DIAGNOSIS — Z00.00 ROUTINE GENERAL MEDICAL EXAMINATION AT A HEALTH CARE FACILITY: ICD-10-CM

## 2022-06-21 DIAGNOSIS — E78.5 HYPERLIPIDEMIA, UNSPECIFIED HYPERLIPIDEMIA TYPE: ICD-10-CM

## 2022-06-21 DIAGNOSIS — Z11.3 SCREEN FOR STD (SEXUALLY TRANSMITTED DISEASE): ICD-10-CM

## 2022-06-21 DIAGNOSIS — G47.00 INSOMNIA, UNSPECIFIED TYPE: ICD-10-CM

## 2022-06-21 DIAGNOSIS — G89.29 CHRONIC HIP PAIN, RIGHT: ICD-10-CM

## 2022-06-21 DIAGNOSIS — M25.551 CHRONIC HIP PAIN, RIGHT: ICD-10-CM

## 2022-06-21 DIAGNOSIS — R53.83 FATIGUE, UNSPECIFIED TYPE: ICD-10-CM

## 2022-06-21 DIAGNOSIS — Z00.00 ROUTINE GENERAL MEDICAL EXAMINATION AT A HEALTH CARE FACILITY: Primary | ICD-10-CM

## 2022-06-21 DIAGNOSIS — F41.9 ANXIETY: ICD-10-CM

## 2022-06-21 LAB
ALBUMIN SERPL-MCNC: 5.2 G/DL (ref 3.5–5.2)
ALBUMIN/GLOB SERPL: 2.1 G/DL
ALP SERPL-CCNC: 36 U/L (ref 39–117)
ALT SERPL W P-5'-P-CCNC: 32 U/L (ref 1–41)
ANION GAP SERPL CALCULATED.3IONS-SCNC: 14 MMOL/L (ref 5–15)
AST SERPL-CCNC: 54 U/L (ref 1–40)
BASOPHILS # BLD AUTO: 0.03 10*3/MM3 (ref 0–0.2)
BASOPHILS NFR BLD AUTO: 0.4 % (ref 0–1.5)
BILIRUB SERPL-MCNC: 0.5 MG/DL (ref 0–1.2)
BUN SERPL-MCNC: 13 MG/DL (ref 6–20)
BUN/CREAT SERPL: 13.5 (ref 7–25)
CALCIUM SPEC-SCNC: 10.1 MG/DL (ref 8.6–10.5)
CHLORIDE SERPL-SCNC: 100 MMOL/L (ref 98–107)
CHOLEST SERPL-MCNC: 193 MG/DL (ref 0–200)
CO2 SERPL-SCNC: 24 MMOL/L (ref 22–29)
CREAT SERPL-MCNC: 0.96 MG/DL (ref 0.76–1.27)
DEPRECATED RDW RBC AUTO: 42.4 FL (ref 37–54)
EGFRCR SERPLBLD CKD-EPI 2021: 110.4 ML/MIN/1.73
EOSINOPHIL # BLD AUTO: 0.19 10*3/MM3 (ref 0–0.4)
EOSINOPHIL NFR BLD AUTO: 2.4 % (ref 0.3–6.2)
ERYTHROCYTE [DISTWIDTH] IN BLOOD BY AUTOMATED COUNT: 13.1 % (ref 12.3–15.4)
GLOBULIN UR ELPH-MCNC: 2.5 GM/DL
GLUCOSE SERPL-MCNC: 91 MG/DL (ref 65–99)
HCT VFR BLD AUTO: 44.3 % (ref 37.5–51)
HDLC SERPL-MCNC: 23 MG/DL (ref 40–60)
HGB BLD-MCNC: 14.5 G/DL (ref 13–17.7)
IMM GRANULOCYTES # BLD AUTO: 0.01 10*3/MM3 (ref 0–0.05)
IMM GRANULOCYTES NFR BLD AUTO: 0.1 % (ref 0–0.5)
LDLC SERPL CALC-MCNC: 154 MG/DL (ref 0–100)
LDLC/HDLC SERPL: 6.66 {RATIO}
LYMPHOCYTES # BLD AUTO: 3.56 10*3/MM3 (ref 0.7–3.1)
LYMPHOCYTES NFR BLD AUTO: 45.7 % (ref 19.6–45.3)
MCH RBC QN AUTO: 28.8 PG (ref 26.6–33)
MCHC RBC AUTO-ENTMCNC: 32.7 G/DL (ref 31.5–35.7)
MCV RBC AUTO: 88.1 FL (ref 79–97)
MONOCYTES # BLD AUTO: 0.49 10*3/MM3 (ref 0.1–0.9)
MONOCYTES NFR BLD AUTO: 6.3 % (ref 5–12)
NEUTROPHILS NFR BLD AUTO: 3.51 10*3/MM3 (ref 1.7–7)
NEUTROPHILS NFR BLD AUTO: 45.1 % (ref 42.7–76)
NRBC BLD AUTO-RTO: 0 /100 WBC (ref 0–0.2)
PLATELET # BLD AUTO: 187 10*3/MM3 (ref 140–450)
PMV BLD AUTO: 12.4 FL (ref 6–12)
POTASSIUM SERPL-SCNC: 4.3 MMOL/L (ref 3.5–5.2)
PROT SERPL-MCNC: 7.7 G/DL (ref 6–8.5)
RBC # BLD AUTO: 5.03 10*6/MM3 (ref 4.14–5.8)
SODIUM SERPL-SCNC: 138 MMOL/L (ref 136–145)
TRIGL SERPL-MCNC: 84 MG/DL (ref 0–150)
VLDLC SERPL-MCNC: 16 MG/DL (ref 5–40)
WBC NRBC COR # BLD: 7.79 10*3/MM3 (ref 3.4–10.8)

## 2022-06-21 PROCEDURE — 2014F MENTAL STATUS ASSESS: CPT | Performed by: PHYSICIAN ASSISTANT

## 2022-06-21 PROCEDURE — 80061 LIPID PANEL: CPT | Performed by: PHYSICIAN ASSISTANT

## 2022-06-21 PROCEDURE — 87491 CHLMYD TRACH DNA AMP PROBE: CPT | Performed by: PHYSICIAN ASSISTANT

## 2022-06-21 PROCEDURE — 84403 ASSAY OF TOTAL TESTOSTERONE: CPT | Performed by: PHYSICIAN ASSISTANT

## 2022-06-21 PROCEDURE — 85025 COMPLETE CBC W/AUTO DIFF WBC: CPT | Performed by: PHYSICIAN ASSISTANT

## 2022-06-21 PROCEDURE — 87591 N.GONORRHOEAE DNA AMP PROB: CPT | Performed by: PHYSICIAN ASSISTANT

## 2022-06-21 PROCEDURE — 3008F BODY MASS INDEX DOCD: CPT | Performed by: PHYSICIAN ASSISTANT

## 2022-06-21 PROCEDURE — 87661 TRICHOMONAS VAGINALIS AMPLIF: CPT | Performed by: PHYSICIAN ASSISTANT

## 2022-06-21 PROCEDURE — 80053 COMPREHEN METABOLIC PANEL: CPT | Performed by: PHYSICIAN ASSISTANT

## 2022-06-21 PROCEDURE — 99395 PREV VISIT EST AGE 18-39: CPT | Performed by: PHYSICIAN ASSISTANT

## 2022-06-21 RX ORDER — ATORVASTATIN CALCIUM 10 MG/1
10 TABLET, FILM COATED ORAL DAILY
Qty: 90 TABLET | Refills: 3 | Status: SHIPPED | OUTPATIENT
Start: 2022-06-21

## 2022-06-21 NOTE — ASSESSMENT & PLAN NOTE
Lipid abnormalities are improving with treatment.  Nutritional counseling was provided. and Pharmacotherapy as ordered.  Lipids will be reassessed in 6 months. Cont Lipitor

## 2022-06-21 NOTE — PROGRESS NOTES
Chief Complaint  Annual Exam    Subjective          History of Present Illness  Venu Larson presents to [facility] for a routine physical.  HLD:  Doing well on Lipitor. No side effects. Taking daily as directed.  Lab Results       Component                Value               Date                       CHOL                     186                 09/16/2021                 TRIG                     71                  09/16/2021                 HDL                      56                  09/16/2021                 LDL                      117 (H)             09/16/2021            Due for FLP today.    Right AVN:  Had recent surgery in January now to reshape femoral head and repair labrum. Has naproxen or diclofenac to take prn for pain. Also was given tramadol after his surgery.  Had right hip surgery earlier this year, started having pain in his hip in 2019. Saw ortho Dr Estrella at  for this surgery. Hip pain has improved since surgery but he was told he may need a hip replacement eventually. Has had improvement in pain with CBD oil.    Anxiety/Insomnia:  Doing well without vistaril at this time. He feels like a lot of his anx/insomnia was related to living situation and environmental. Staying sober, no relapse, hx of alcohol abuse.      Diet/exercise: has been working on diet and exercise lately. He is experimenting with fasting and intermittent fasting to help with weight loss. Has lost 32 lbs in the last 6 months, has cut out carbs and sugars. Since he had hip surgery he has been more active.  Eye exams: not seeing eye dr regularly.  Dental exams: not seeing dentist due to ins coverage.     No LMP for male patient.   reports previously being sexually active and has had partner(s) who are female. He reports using the following method of birth control/protection: Condom.  Cancer-related family history is not on file.     reports that he has quit smoking. His smoking use  included cigarettes. He has never used smokeless tobacco.     Health Maintenance   Topic Date Due   • COVID-19 Vaccine (1) Never done   • ANNUAL PHYSICAL  02/02/2022   • LIPID PANEL  09/16/2022   • INFLUENZA VACCINE  10/01/2022   • TDAP/TD VACCINES (2 - Td or Tdap) 02/01/2031   • HEPATITIS C SCREENING  Completed   • Pneumococcal Vaccine 0-64  Aged Out       Immunization History   Administered Date(s) Administered   • Tdap 02/01/2021       Review of Systems   Constitutional: Negative for fatigue, fever and unexpected weight loss.   Eyes: Negative for visual disturbance.   Respiratory: Negative for cough, shortness of breath and wheezing.    Cardiovascular: Negative for chest pain and palpitations.   Gastrointestinal: Negative for abdominal pain, blood in stool, constipation, diarrhea, nausea and indigestion.   Endocrine: Negative for polydipsia and polyuria.   Genitourinary: Negative for dysuria and hematuria.   Musculoskeletal: Positive for arthralgias. Negative for back pain, joint swelling and myalgias.   Skin: Negative for rash.   Neurological: Negative for dizziness, syncope, headache and confusion.   Psychiatric/Behavioral: Negative for sleep disturbance and depressed mood. The patient is not nervous/anxious.        The following portions of the patient's history were reviewed and updated as appropriate: allergies, current medications, past family history, past medical history, past social history, past surgical history and problem list.    Patient Active Problem List   Diagnosis   • Umbilical hernia without obstruction and without gangrene   • Elevated LFTs   • Insomnia   • Chronic hip pain, right   • Anxiety   • Hyperlipidemia   • Fatigue   • Avascular necrosis (HCC)   • Left foot pain   • Osteoarthritis of right hip   • Wheeze     No Known Allergies  Current Outpatient Medications on File Prior to Visit   Medication Sig Dispense Refill   • Omega-3 Fatty Acids (fish oil) 1000 MG capsule capsule Take  by mouth  Daily With Breakfast.     • [DISCONTINUED] atorvastatin (LIPITOR) 10 MG tablet Take 1 tablet by mouth Daily. 90 tablet 1   • albuterol sulfate  (90 Base) MCG/ACT inhaler Inhale 2 puffs Every 4 (Four) Hours As Needed for Wheezing. 18 g 0   • diclofenac (VOLTAREN) 75 MG EC tablet Take 1 tablet by mouth 2 (Two) Times a Day. 60 tablet 2   • loratadine (Claritin) 10 MG tablet Take 1 tablet by mouth Daily. 30 tablet 1   • [DISCONTINUED] hydrOXYzine pamoate (Vistaril) 25 MG capsule Take 1-2 capsules by mouth At Night As Needed (insomnia). 60 capsule 1   • [DISCONTINUED] traMADol (ULTRAM) 50 MG tablet Take 50 mg by mouth.       No current facility-administered medications on file prior to visit.     New Medications Ordered This Visit   Medications   • atorvastatin (LIPITOR) 10 MG tablet     Sig: Take 1 tablet by mouth Daily.     Dispense:  90 tablet     Refill:  3     Past Medical History:   Diagnosis Date   • Alcohol addiction (HCC)    • COVID    • Injury of back    • Narcotic addiction (HCC)       Past Surgical History:   Procedure Laterality Date   • FEMUR SURGERY Right 2021   • HIP SURGERY Right 2021   • PREPERITONEAL HERNIA REPAIR Right     removal of hernia from right leg   • TONSILLECTOMY        Family History   Problem Relation Age of Onset   • Diabetes Mother    • Diabetes Father       Social History     Socioeconomic History   • Marital status: Single   Tobacco Use   • Smoking status: Former Smoker     Types: Cigarettes   • Smokeless tobacco: Never Used   Vaping Use   • Vaping Use: Never used   Substance and Sexual Activity   • Alcohol use: Not Currently     Comment: sobriety date 5/1/2019   • Drug use: Not Currently     Types: Marijuana   • Sexual activity: Not Currently     Partners: Female     Birth control/protection: Condom        Objective   Vital Signs:   Vitals:    06/21/22 1543   BP: 124/78   Pulse: 68   Resp: 20   Temp: 97.1 °F (36.2 °C)   TempSrc: Temporal   SpO2: 98%   Weight: 98.9 kg (218 lb)  "  Height: 171.5 cm (67.52\")   PainSc: 0-No pain      Body mass index is 33.62 kg/m².  BMI is >= 30 and <35. (Class 1 Obesity). The following options were offered after discussion;: exercise counseling/recommendations and nutrition counseling/recommendations    Physical Exam  Vitals reviewed.   Constitutional:       General: He is not in acute distress.     Appearance: Normal appearance. He is not ill-appearing.   HENT:      Head: Normocephalic and atraumatic.      Right Ear: Tympanic membrane, ear canal and external ear normal.      Left Ear: Tympanic membrane, ear canal and external ear normal.      Mouth/Throat:      Mouth: Mucous membranes are moist.      Pharynx: No oropharyngeal exudate or posterior oropharyngeal erythema.   Eyes:      General: No scleral icterus.     Extraocular Movements: Extraocular movements intact.      Conjunctiva/sclera: Conjunctivae normal.      Pupils: Pupils are equal, round, and reactive to light.   Neck:      Thyroid: No thyromegaly.   Cardiovascular:      Rate and Rhythm: Normal rate and regular rhythm.      Pulses: Normal pulses.      Heart sounds: Normal heart sounds. No murmur heard.  Pulmonary:      Effort: Pulmonary effort is normal. No respiratory distress.      Breath sounds: Normal breath sounds. No stridor. No wheezing, rhonchi or rales.   Abdominal:      General: Bowel sounds are normal. There is no distension.      Palpations: Abdomen is soft. There is no mass.      Tenderness: There is no abdominal tenderness. There is no guarding.   Musculoskeletal:         General: No signs of injury. Normal range of motion.      Cervical back: Normal range of motion and neck supple.      Right lower leg: No edema.      Left lower leg: No edema.   Lymphadenopathy:      Cervical: No cervical adenopathy.   Skin:     General: Skin is warm and dry.      Coloration: Skin is not jaundiced.      Findings: No rash.   Neurological:      General: No focal deficit present.      Mental Status: He " is alert and oriented to person, place, and time.      Gait: Gait normal.   Psychiatric:         Mood and Affect: Mood normal.         Behavior: Behavior normal.          Result Review :                   Assessment and Plan    Diagnoses and all orders for this visit:    1. Routine general medical examination at a health care facility (Primary)  -     Comprehensive Metabolic Panel; Future  -     Lipid Panel; Future  -     CBC Auto Differential; Future    2. Pure hypercholesterolemia  Assessment & Plan:  Lipid abnormalities are improving with treatment.  Nutritional counseling was provided. and Pharmacotherapy as ordered.  Lipids will be reassessed in 6 months. Cont Lipitor     Orders:  -     Lipid Panel; Future    3. Anxiety  Assessment & Plan:  Chronic, stable, improved with lifestyle changes      4. Insomnia, unspecified type  Assessment & Plan:  Chronic, stable with lifestyle changes.       5. Chronic hip pain, right  Assessment & Plan:  F/u with [facility] as directed. Prn nsaids      6. Hyperlipidemia, unspecified hyperlipidemia type  Assessment & Plan:  Lipid abnormalities are improving with treatment.  Nutritional counseling was provided. and Pharmacotherapy as ordered.  Lipids will be reassessed in 6 months. Cont Lipitor     Orders:  -     atorvastatin (LIPITOR) 10 MG tablet; Take 1 tablet by mouth Daily.  Dispense: 90 tablet; Refill: 3    7. Screen for STD (sexually transmitted disease)  -     Chlamydia trachomatis, Neisseria gonorrhoeae, Trichomonas vaginalis, PCR - Urine, Urine, Clean Catch; Future          Follow Up   Return in about 6 months (around 12/21/2022) for hyperlipidemia.    Counseled on health maintenance topics and preventative care recommendations. Follow up yearly for routine physical exam. Diet and exercise counseling given. See dentist and eye doctor yearly as directed.    If a referral was made please contact our office if you have not heard about an appointment in the next 2 weeks.   If  labs or images are ordered we will contact you with the results within the next week.  If you have not heard from us after a week please call our office to inquire about the results.    Catherine Edwards PA-C    Patient was given instructions and counseling regarding his condition or for health maintenance advice. Please see specific information pulled into the AVS if appropriate.     * Please note that portions of this note were completed with a voice recognition program.

## 2022-06-22 ENCOUNTER — TELEPHONE (OUTPATIENT)
Dept: INTERNAL MEDICINE | Facility: CLINIC | Age: 27
End: 2022-06-22

## 2022-06-22 DIAGNOSIS — R53.83 FATIGUE, UNSPECIFIED TYPE: Primary | ICD-10-CM

## 2022-06-22 DIAGNOSIS — E78.5 HYPERLIPIDEMIA, UNSPECIFIED HYPERLIPIDEMIA TYPE: ICD-10-CM

## 2022-06-22 LAB — TESTOST SERPL-MCNC: 256 NG/DL (ref 249–836)

## 2022-06-22 NOTE — TELEPHONE ENCOUNTER
I have added it to the blood work, should have results in a few days. If it is abnormal we would repeat it fasting in the morning.

## 2022-06-22 NOTE — TELEPHONE ENCOUNTER
----- Message from Catherine Edwards PA-C sent at 6/22/2022 12:32 PM EDT -----  Testosterone is normal but on the low end, we can monitor this.

## 2022-06-22 NOTE — TELEPHONE ENCOUNTER
----- Message from Catherine Edwards PA-C sent at 6/22/2022  8:15 AM EDT -----  Labs looked ok except cholesterol had increased some. Make sure to take the lipitor daily, cont to work on diet and exercise.

## 2022-06-22 NOTE — TELEPHONE ENCOUNTER
Pt informed on testosterone levels. He verbalized good understanding and appreciation.     He does state he would like to have his labs for cholesterol and testosterone rechecked in 3 months if you could put the orders in?

## 2022-06-22 NOTE — TELEPHONE ENCOUNTER
Pt informed. He would like to recheck his cholesterol in 6 months.     Did we test his testosterone? If not, can we add this test for him to come in and get? What are the right times of day to get testosterone tested?

## 2022-06-24 LAB
C TRACH RRNA SPEC QL NAA+PROBE: NEGATIVE
N GONORRHOEA RRNA SPEC QL NAA+PROBE: NEGATIVE
T VAGINALIS RRNA SPEC QL NAA+PROBE: NEGATIVE

## 2023-04-18 DIAGNOSIS — R06.2 WHEEZE: ICD-10-CM

## 2023-04-21 NOTE — TELEPHONE ENCOUNTER
Last Office Visit: 6/21/2022   Next Office Visit: needs future appt scheduled - LVM for pt to make f/u appt.     Medication: albuterol sulfate   Last Refill Date: 6/21/2022  Quantity: 90  Refills: 0    Pharmacy:  [facility] [id number] [place] [address] [address] [facility] - [phone]  - [phone] FX    Please review pended refill request for any changes needed on refills or quantities. Thank you!

## 2023-04-23 RX ORDER — ALBUTEROL SULFATE 90 UG/1
AEROSOL, METERED RESPIRATORY (INHALATION)
Qty: 18 G | Refills: 0 | Status: SHIPPED | OUTPATIENT
Start: 2023-04-23

## 2024-12-22 ENCOUNTER — APPOINTMENT (OUTPATIENT)
Facility: HOSPITAL | Age: 29
End: 2024-12-22
Payer: COMMERCIAL

## 2024-12-22 ENCOUNTER — HOSPITAL ENCOUNTER (EMERGENCY)
Facility: HOSPITAL | Age: 29
Discharge: HOME | End: 2024-12-22
Attending: EMERGENCY MEDICINE | Admitting: EMERGENCY MEDICINE
Payer: COMMERCIAL

## 2024-12-22 VITALS
HEART RATE: 102 BPM | DIASTOLIC BLOOD PRESSURE: 97 MMHG | RESPIRATION RATE: 18 BRPM | HEIGHT: 69 IN | SYSTOLIC BLOOD PRESSURE: 154 MMHG | BODY MASS INDEX: 39.25 KG/M2 | TEMPERATURE: 98.3 F | WEIGHT: 265 LBS | OXYGEN SATURATION: 97 %
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
## 2024-12-22 DIAGNOSIS — M25.571 ACUTE RIGHT ANKLE PAIN: ICD-10-CM

## 2024-12-22 DIAGNOSIS — M25.551 RIGHT HIP PAIN: ICD-10-CM

## 2024-12-22 DIAGNOSIS — M16.11 PRIMARY OSTEOARTHRITIS OF RIGHT HIP: ICD-10-CM

## 2024-12-22 PROCEDURE — 73610 X-RAY EXAM OF ANKLE: CPT

## 2024-12-22 PROCEDURE — 99283 EMERGENCY DEPT VISIT LOW MDM: CPT

## 2024-12-22 PROCEDURE — 73502 X-RAY EXAM HIP UNI 2-3 VIEWS: CPT

## 2024-12-22 RX ORDER — METOPROLOL SUCCINATE 25 MG/1
50 TABLET, EXTENDED RELEASE ORAL DAILY
COMMUNITY
Start: 2024-11-05

## 2024-12-22 RX ORDER — METOPROLOL TARTRATE 25 MG/1
25 TABLET, FILM COATED ORAL ONCE
Status: COMPLETED | OUTPATIENT
Start: 2024-12-22 | End: 2024-12-22

## 2024-12-22 RX ORDER — TESTOSTERONE CYPIONATE 200 MG/ML
200 INJECTION, SOLUTION INTRAMUSCULAR
COMMUNITY
Start: 2024-12-13

## 2024-12-22 RX ORDER — ASPIRIN 81 MG/1
81 TABLET, CHEWABLE ORAL DAILY
COMMUNITY
Start: 2024-03-12 | End: 2025-03-12

## 2024-12-22 RX ORDER — NITROGLYCERIN 0.4 MG/1
0.4 TABLET SUBLINGUAL
COMMUNITY
Start: 2024-05-07 | End: 2025-05-07

## 2024-12-22 RX ORDER — LISINOPRIL 5 MG/1
10 TABLET ORAL DAILY
COMMUNITY
Start: 2024-11-05

## 2024-12-22 RX ADMIN — METOPROLOL TARTRATE 25 MG: 25 TABLET, FILM COATED ORAL at 14:27

## 2024-12-22 NOTE — Clinical Note
[facility] EMERGENCY DEPARTMENT [place]  [address]  [facility] [address]  Phone: [phone]  Fax: [phone]    Venu Larson was seen and treated in our emergency department on 12/22/2024.  He may return to work on 12/23/2024.         Thank you for choosing [facility].    [place], SIMEON Shannon

## 2024-12-22 NOTE — DISCHARGE INSTRUCTIONS
Please  your blood pressure medications tomorrow and resume as scheduled.  No evidence of any fracture to your hip or ankle today.  Recommend close outpatient follow-up with your primary care provider.  If you have any worsening symptoms or concerns please return to ED.

## 2024-12-22 NOTE — FSED PROVIDER NOTE
Subjective  History of Present Illness:    29-year-old male PMH avascular necrosis of right hip, hypertension, substance and alcohol abuse presents to ED for evaluation of right hip pain.  Patient states that he had a mechanical fall on the ice outside 2 days ago.  He has had pain to the right hip and right ankle since.  Denies any decreased intromission, paresthesias, numbness of the extremity.  He did not hit his head or lose consciousness.  He also notes that his blood pressure has been elevated as he has been out of his blood pressure medication, metoprolol and lisinopril.  He believes he does have refills at the pharmacy for these medications however he has not had transportation to go pick them up.  States that he has had a headache relative to high BP without any vomiting, focal weakness, numbness, paresthesias, acute visual changes.      Nurses Notes reviewed and agree, including vitals, allergies, social history and prior medical history.     REVIEW OF SYSTEMS: All systems reviewed and not pertinent unless noted.  Review of Systems   Eyes:  Negative for visual disturbance.   Musculoskeletal:         Right hip and right ankle pain   Neurological:  Positive for headaches. Negative for weakness and numbness.   All other systems reviewed and are negative.      Past Medical History:   Diagnosis Date    Alcohol addiction     COVID     Injury of back     Narcotic addiction        Allergies:    Patient has no known allergies.      Past Surgical History:   Procedure Laterality Date    FEMUR SURGERY Right 2021    HIP SURGERY Right 2021    PREPERITONEAL HERNIA REPAIR Right     removal of hernia from right leg    TONSILLECTOMY           Social History     Socioeconomic History    Marital status: Single   Tobacco Use    Smoking status: Former     Types: Cigarettes    Smokeless tobacco: Never   Vaping Use    Vaping status: Never Used   Substance and Sexual Activity    Alcohol use: Not Currently     Comment: sobriety date  "5/1/2019    Drug use: Not Currently     Types: Marijuana    Sexual activity: Not Currently     Partners: Female     Birth control/protection: Condom         Family History   Problem Relation Age of Onset    Diabetes Mother     Diabetes Father        Objective  Physical Exam:  /97   Pulse 102   Temp 98.3 °F (36.8 °C) (Oral)   Resp 18   Ht 175.3 cm (69\")   Wt 120 kg (265 lb)   SpO2 97%   BMI 39.13 kg/m²      Physical Exam  Vitals and nursing note reviewed.   Constitutional:       General: He is not in acute distress.  HENT:      Head: Normocephalic and atraumatic.   Cardiovascular:      Rate and Rhythm: Normal rate and regular rhythm.   Pulmonary:      Effort: Pulmonary effort is normal. No respiratory distress.      Breath sounds: Normal breath sounds.   Musculoskeletal:         General: Normal range of motion.      Comments: Normal inspection of the right lower extremity without any obvious deformity, ecchymosis, edema.  He has some mild tenderness to palpation of the right lateral aspect of the hip as well as some tenderness to right medial malleoli.  Full range of motion without any sensory deficit, the extremity is neurovascularly intact.  Warm with normal palpable pulses and cap refill.   Skin:     General: Skin is warm and dry.   Neurological:      General: No focal deficit present.      Mental Status: He is alert.      Cranial Nerves: No cranial nerve deficit.      Sensory: No sensory deficit.      Motor: No weakness.      Comments: Awake and alert   Psychiatric:         Mood and Affect: Mood normal.         Behavior: Behavior normal.         Procedures    ED Course:         Lab Results (last 24 hours)       ** No results found for the last 24 hours. **             XR Hip With or Without Pelvis 2 - 3 View Right    Result Date: 12/22/2024  XR HIP W OR WO PELVIS 2-3 VIEW RIGHT Date of Exam: 12/22/2024 12:38 PM EST Indication: right hip pain sp fall Comparison: MRI February 22, 2021, radiographs " February 1, 2021 Findings: There is asymmetric prominent right hip osteophyte formation with moderate to severe superior-lateral joint space narrowing. This appears to have progressed compared to prior imaging. Avascular necrosis was seen on the prior MRI. There is subtle loss of the articular surface contour of the femoral head indicating a component of articular surface collapse. There is calcification of the labrum. Overlapping structures somewhat limit assessment of the right hip. No definite displaced fracture is identified. No significant left hip arthropathy. No other acute osseous abnormality identified in the pelvis.     Impression: Impression: 1.Avascular necrosis of the femoral head as was seen on the prior MRI with subtle findings of articular surface collapse. Moderate to severe right hip osteoarthritis appears to have progressed compared to prior imaging from 2021. 2.No definite displaced fracture is identified. If there is continued clinical concern, MRI would be recommended for further evaluation. Electronically Signed: Dariusz Alexandre  12/22/2024 1:10 PM EST  Workstation ID: [id number]    XR Ankle 3+ View Right    Result Date: 12/22/2024  XR ANKLE 3+ VW RIGHT Date of Exam: 12/22/2024 12:39 PM EST Indication: R ankle pain after fall Comparison: None available. Findings/    Impression: Impression: Multiple views of the right ankle were obtained. No acute fracture or dislocation is identified.  Bony alignment appears intact. No osseous erosions are seen. Soft tissues demonstrate no acute abnormality. Electronically Signed: Sean Pyle MD  12/22/2024 1:09 PM EST  Workstation ID: [id number]        MDM      Initial impression of presenting illness: 29-year-old male presents to ED for evaluation of right hip and ankle pain after fall on ice 2 days ago.  Also notes that he has been unable to  his blood pressure medications, metoprolol and lisinopril from pharmacy and has had elevated blood pressure with  headache.  No acute visual change, focal weakness, numbness, paresthesias, chest pain, shortness of breath    DDX: includes but is not limited to: Fracture, dislocation, uncontrolled blood pressure, hypertensive urgency versus emergency    Patient arrives afebrile with stable vitals interpreted by myself.  Hypertensive at 177/123.  However blood pressure trends down to 154/97 without any intervention needed in the emergency department.    Pertinent features from physical exam: Physical exam without any focal neurodeficits relative to hypertension upon arrival.  He has some mild tenderness of his right ankle at the medial malleolus and tenderness of the right hip however minimal pain with right leg raise and full range of motion of the extremity.  Patient ambulates on the extremity without significant pain.    Initial diagnostic plan: X-ray of right hip, right ankle    Results from initial plan were reviewed and appreciates avascular necrosis of the femoral head as appreciated on previous MRI with subtle findings of articular surface collapse.  Patient has moderate to severe right hip osteoarthritis that has progressed compared to prior imaging in 2021.  No definitive displaced fracture is appreciated.  Given that patient is ambulating on the extremity low clinical suspicion of fracture.  Discussed with attending who recommended outpatient follow-up.  X-ray of the ankle benign.  Patient administered a dose of metoprolol during visit as he has been without his dose for the last several days.  He has been instructed to  his prescriptions tomorrow and notes that he does have transportation to the pharmacy at that time.  Patient will follow-up closely with PCP and return to ED for worsening symptoms or concerns.    Interventions: Medications administered as below    Medications   metoprolol tartrate (LOPRESSOR) tablet 25 mg (25 mg Oral Given 12/22/24 4558)     Consultations/Discussion of results with other  physicians: Attending, Dr. Mallory, as above.      -----  ED Disposition       ED Disposition   Discharge    Condition   Stable    Comment   --             Final diagnoses:   Fall, initial encounter   Right hip pain   Acute right ankle pain   Primary osteoarthritis of right hip      Your Follow-Up Providers       Go to  [facility] EMERGENCY DEPARTMENT [place].    Specialty: Emergency Medicine  Follow up details: If symptoms worsen  [address]  [place] [address] [phone]             Provider, No Known.    [facility] [address]                       Contact information for after-discharge care    Follow-up information has not been specified.                    Your medication list        CONTINUE taking these medications        Instructions Last Dose Given Next Dose Due   aspirin 81 MG chewable tablet      Chew 1 tablet Daily.       atorvastatin 10 MG tablet  Commonly known as: LIPITOR      Take 1 tablet by mouth Daily.       diclofenac 75 MG EC tablet  Commonly known as: VOLTAREN      Take 1 tablet by mouth 2 (Two) Times a Day.       fish oil 1000 MG capsule capsule      Take  by mouth Daily With Breakfast.       lisinopril 5 MG tablet  Commonly known as: PRINIVIL,ZESTRIL      Take 2 tablets by mouth Daily.       loratadine 10 MG tablet  Commonly known as: Claritin      Take 1 tablet by mouth Daily.       metoprolol succinate XL 25 MG 24 hr tablet  Commonly known as: TOPROL-XL      Take 2 tablets by mouth Daily.       nitroglycerin 0.4 MG SL tablet  Commonly known as: NITROSTAT      Place 1 tablet under the tongue Every 5 (Five) Minutes As Needed.       Testosterone Cypionate 200 MG/ML injection  Commonly known as: DEPOTESTOTERONE CYPIONATE      Inject 1 mL into the appropriate muscle as directed by prescriber Every 28 (Twenty-Eight) Days.       Ventolin  (90 Base) MCG/ACT inhaler  Generic drug: albuterol sulfate HFA      INHALE TWO PUFFS  BY MOUTH EVERY 4 HOURS AS NEEDED FOR WHEEZING

## 2025-04-30 ENCOUNTER — HOSPITAL ENCOUNTER (EMERGENCY)
Facility: HOSPITAL | Age: 30
Discharge: OTHER INSTITUTION | End: 2025-04-30
Attending: STUDENT IN AN ORGANIZED HEALTH CARE EDUCATION/TRAINING PROGRAM
Payer: MEDICAID

## 2025-04-30 ENCOUNTER — APPOINTMENT (OUTPATIENT)
Facility: HOSPITAL | Age: 30
End: 2025-04-30
Payer: MEDICAID

## 2025-04-30 VITALS
HEART RATE: 107 BPM | RESPIRATION RATE: 20 BRPM | WEIGHT: 264.55 LBS | HEIGHT: 68 IN | SYSTOLIC BLOOD PRESSURE: 129 MMHG | TEMPERATURE: 99.8 F | DIASTOLIC BLOOD PRESSURE: 79 MMHG | BODY MASS INDEX: 40.09 KG/M2 | OXYGEN SATURATION: 96 %

## 2025-04-30 DIAGNOSIS — L08.9 DOG BITE OF LEFT FOREARM WITH INFECTION, INITIAL ENCOUNTER: ICD-10-CM

## 2025-04-30 DIAGNOSIS — M72.6 NECROTIZING FASCIITIS OF FOREARM: Primary | ICD-10-CM

## 2025-04-30 DIAGNOSIS — S51.852A DOG BITE OF LEFT FOREARM WITH INFECTION, INITIAL ENCOUNTER: ICD-10-CM
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
## 2025-04-30 DIAGNOSIS — M65.939 TENOSYNOVITIS OF WRIST FLEXOR: ICD-10-CM

## 2025-04-30 DIAGNOSIS — A41.9 SEPSIS WITHOUT ACUTE ORGAN DYSFUNCTION, DUE TO UNSPECIFIED ORGANISM: ICD-10-CM

## 2025-04-30 DIAGNOSIS — L03.114 CELLULITIS OF LEFT UPPER EXTREMITY: ICD-10-CM

## 2025-04-30 LAB
ALBUMIN SERPL-MCNC: 4 G/DL (ref 3.5–5.2)
ALBUMIN/GLOB SERPL: 1.4 G/DL
ALP SERPL-CCNC: 48 U/L (ref 39–117)
ALT SERPL W P-5'-P-CCNC: 64 U/L (ref 1–41)
AMPHET+METHAMPHET UR QL: NEGATIVE
AMPHETAMINES UR QL: NEGATIVE
ANION GAP SERPL CALCULATED.3IONS-SCNC: 17.4 MMOL/L (ref 5–15)
AST SERPL-CCNC: 42 U/L (ref 1–40)
BARBITURATES UR QL SCN: NEGATIVE
BASOPHILS # BLD AUTO: 0.02 10*3/MM3 (ref 0–0.2)
BASOPHILS NFR BLD AUTO: 0.1 % (ref 0–1.5)
BENZODIAZ UR QL SCN: NEGATIVE
BILIRUB SERPL-MCNC: 0.6 MG/DL (ref 0–1.2)
BUN SERPL-MCNC: 5 MG/DL (ref 6–20)
BUN/CREAT SERPL: 7.4 (ref 7–25)
BUPRENORPHINE SERPL-MCNC: NEGATIVE NG/ML
CALCIUM SPEC-SCNC: 9 MG/DL (ref 8.6–10.5)
CANNABINOIDS SERPL QL: POSITIVE
CHLORIDE SERPL-SCNC: 101 MMOL/L (ref 98–107)
CO2 SERPL-SCNC: 21.6 MMOL/L (ref 22–29)
COCAINE UR QL: NEGATIVE
CREAT SERPL-MCNC: 0.68 MG/DL (ref 0.76–1.27)
CRP SERPL-MCNC: 14 MG/DL (ref 0–0.5)
D-LACTATE SERPL-SCNC: 2.4 MMOL/L (ref 0.5–2)
DEPRECATED RDW RBC AUTO: 45 FL (ref 37–54)
EGFRCR SERPLBLD CKD-EPI 2021: 127.4 ML/MIN/1.73
EOSINOPHIL # BLD AUTO: 0.02 10*3/MM3 (ref 0–0.4)
EOSINOPHIL NFR BLD AUTO: 0.1 % (ref 0.3–6.2)
ERYTHROCYTE [DISTWIDTH] IN BLOOD BY AUTOMATED COUNT: 13.6 % (ref 12.3–15.4)
ERYTHROCYTE [SEDIMENTATION RATE] IN BLOOD: 33 MM/HR (ref 0–15)
FENTANYL UR-MCNC: NEGATIVE NG/ML
GLOBULIN UR ELPH-MCNC: 2.8 GM/DL
GLUCOSE SERPL-MCNC: 182 MG/DL (ref 65–99)
HCT VFR BLD AUTO: 40.1 % (ref 37.5–51)
HGB BLD-MCNC: 13.8 G/DL (ref 13–17.7)
HOLD SPECIMEN: NORMAL
IMM GRANULOCYTES # BLD AUTO: 0.02 10*3/MM3 (ref 0–0.05)
IMM GRANULOCYTES NFR BLD AUTO: 0.1 % (ref 0–0.5)
INR PPP: 1.07 (ref 0.89–1.12)
LYMPHOCYTES # BLD AUTO: 2.94 10*3/MM3 (ref 0.7–3.1)
LYMPHOCYTES NFR BLD AUTO: 21.7 % (ref 19.6–45.3)
MAGNESIUM SERPL-MCNC: 1.9 MG/DL (ref 1.6–2.6)
MCH RBC QN AUTO: 30.7 PG (ref 26.6–33)
MCHC RBC AUTO-ENTMCNC: 34.4 G/DL (ref 31.5–35.7)
MCV RBC AUTO: 89.3 FL (ref 79–97)
METHADONE UR QL SCN: NEGATIVE
MONOCYTES # BLD AUTO: 1.36 10*3/MM3 (ref 0.1–0.9)
MONOCYTES NFR BLD AUTO: 10 % (ref 5–12)
NEUTROPHILS NFR BLD AUTO: 68 % (ref 42.7–76)
NEUTROPHILS NFR BLD AUTO: 9.18 10*3/MM3 (ref 1.7–7)
OPIATES UR QL: NEGATIVE
OXYCODONE UR QL SCN: NEGATIVE
PCP UR QL SCN: NEGATIVE
PLATELET # BLD AUTO: 155 10*3/MM3 (ref 140–450)
PMV BLD AUTO: 10.9 FL (ref 6–12)
POTASSIUM SERPL-SCNC: 3.3 MMOL/L (ref 3.5–5.2)
PROCALCITONIN SERPL-MCNC: 0.07 NG/ML (ref 0–0.25)
PROT SERPL-MCNC: 6.8 G/DL (ref 6–8.5)
PROTHROMBIN TIME: 14.1 SECONDS (ref 12.2–14.5)
RBC # BLD AUTO: 4.49 10*6/MM3 (ref 4.14–5.8)
SODIUM SERPL-SCNC: 140 MMOL/L (ref 136–145)
TRICYCLICS UR QL SCN: NEGATIVE
WBC NRBC COR # BLD AUTO: 13.54 10*3/MM3 (ref 3.4–10.8)
WHOLE BLOOD HOLD COAG: NORMAL
WHOLE BLOOD HOLD SPECIMEN: NORMAL

## 2025-04-30 PROCEDURE — 36415 COLL VENOUS BLD VENIPUNCTURE: CPT

## 2025-04-30 PROCEDURE — 85025 COMPLETE CBC W/AUTO DIFF WBC: CPT | Performed by: STUDENT IN AN ORGANIZED HEALTH CARE EDUCATION/TRAINING PROGRAM

## 2025-04-30 PROCEDURE — 25810000003 LACTATED RINGERS PER 1000 ML: Performed by: PHYSICIAN ASSISTANT

## 2025-04-30 PROCEDURE — 86140 C-REACTIVE PROTEIN: CPT | Performed by: PHYSICIAN ASSISTANT

## 2025-04-30 PROCEDURE — 90715 TDAP VACCINE 7 YRS/> IM: CPT | Performed by: PHYSICIAN ASSISTANT

## 2025-04-30 PROCEDURE — 80307 DRUG TEST PRSMV CHEM ANLYZR: CPT | Performed by: PHYSICIAN ASSISTANT

## 2025-04-30 PROCEDURE — 73201 CT UPPER EXTREMITY W/DYE: CPT

## 2025-04-30 PROCEDURE — 84145 PROCALCITONIN (PCT): CPT | Performed by: PHYSICIAN ASSISTANT

## 2025-04-30 PROCEDURE — 87070 CULTURE OTHR SPECIMN AEROBIC: CPT | Performed by: PHYSICIAN ASSISTANT

## 2025-04-30 PROCEDURE — 87040 BLOOD CULTURE FOR BACTERIA: CPT | Performed by: STUDENT IN AN ORGANIZED HEALTH CARE EDUCATION/TRAINING PROGRAM

## 2025-04-30 PROCEDURE — 73090 X-RAY EXAM OF FOREARM: CPT

## 2025-04-30 PROCEDURE — 85652 RBC SED RATE AUTOMATED: CPT | Performed by: PHYSICIAN ASSISTANT

## 2025-04-30 PROCEDURE — 25010000002 TETANUS-DIPHTH-ACELL PERTUSSIS 5-2.5-18.5 LF-MCG/0.5 SUSPENSION PREFILLED SYRINGE: Performed by: PHYSICIAN ASSISTANT

## 2025-04-30 PROCEDURE — 96368 THER/DIAG CONCURRENT INF: CPT

## 2025-04-30 PROCEDURE — 85610 PROTHROMBIN TIME: CPT | Performed by: PHYSICIAN ASSISTANT

## 2025-04-30 PROCEDURE — 87205 SMEAR GRAM STAIN: CPT | Performed by: PHYSICIAN ASSISTANT

## 2025-04-30 PROCEDURE — 71045 X-RAY EXAM CHEST 1 VIEW: CPT

## 2025-04-30 PROCEDURE — 25810000003 LACTATED RINGERS SOLUTION: Performed by: PHYSICIAN ASSISTANT

## 2025-04-30 PROCEDURE — 25010000002 CLINDAMYCIN 600 MG/50ML SOLUTION: Performed by: PHYSICIAN ASSISTANT

## 2025-04-30 PROCEDURE — 25510000001 IOPAMIDOL 61 % SOLUTION: Performed by: STUDENT IN AN ORGANIZED HEALTH CARE EDUCATION/TRAINING PROGRAM

## 2025-04-30 PROCEDURE — 25010000002 VANCOMYCIN 1 G RECONSTITUTED SOLUTION: Performed by: PHYSICIAN ASSISTANT

## 2025-04-30 PROCEDURE — 25010000002 POTASSIUM CHLORIDE 10 MEQ/100ML SOLUTION: Performed by: PHYSICIAN ASSISTANT

## 2025-04-30 PROCEDURE — 96366 THER/PROPH/DIAG IV INF ADDON: CPT

## 2025-04-30 PROCEDURE — 80053 COMPREHEN METABOLIC PANEL: CPT | Performed by: STUDENT IN AN ORGANIZED HEALTH CARE EDUCATION/TRAINING PROGRAM

## 2025-04-30 PROCEDURE — 83735 ASSAY OF MAGNESIUM: CPT | Performed by: PHYSICIAN ASSISTANT

## 2025-04-30 PROCEDURE — 90471 IMMUNIZATION ADMIN: CPT | Performed by: PHYSICIAN ASSISTANT

## 2025-04-30 PROCEDURE — 99285 EMERGENCY DEPT VISIT HI MDM: CPT | Performed by: STUDENT IN AN ORGANIZED HEALTH CARE EDUCATION/TRAINING PROGRAM

## 2025-04-30 PROCEDURE — 83605 ASSAY OF LACTIC ACID: CPT | Performed by: STUDENT IN AN ORGANIZED HEALTH CARE EDUCATION/TRAINING PROGRAM

## 2025-04-30 PROCEDURE — 87077 CULTURE AEROBIC IDENTIFY: CPT | Performed by: PHYSICIAN ASSISTANT

## 2025-04-30 PROCEDURE — 25810000003 SODIUM CHLORIDE 0.9 % SOLUTION: Performed by: PHYSICIAN ASSISTANT

## 2025-04-30 PROCEDURE — 87186 SC STD MICRODIL/AGAR DIL: CPT | Performed by: PHYSICIAN ASSISTANT

## 2025-04-30 PROCEDURE — 96365 THER/PROPH/DIAG IV INF INIT: CPT

## 2025-04-30 PROCEDURE — 25010000002 PIPERACILLIN SOD-TAZOBACTAM PER 1 G: Performed by: PHYSICIAN ASSISTANT

## 2025-04-30 RX ORDER — SODIUM CHLORIDE 0.9 % (FLUSH) 0.9 %
10 SYRINGE (ML) INJECTION AS NEEDED
Status: DISCONTINUED | OUTPATIENT
Start: 2025-04-30 | End: 2025-04-30 | Stop reason: HOSPADM

## 2025-04-30 RX ORDER — POTASSIUM CHLORIDE 7.45 MG/ML
10 INJECTION INTRAVENOUS ONCE
Status: COMPLETED | OUTPATIENT
Start: 2025-04-30 | End: 2025-04-30

## 2025-04-30 RX ORDER — SODIUM CHLORIDE, SODIUM LACTATE, POTASSIUM CHLORIDE, CALCIUM CHLORIDE 600; 310; 30; 20 MG/100ML; MG/100ML; MG/100ML; MG/100ML
200 INJECTION, SOLUTION INTRAVENOUS CONTINUOUS
Status: DISCONTINUED | OUTPATIENT
Start: 2025-04-30 | End: 2025-04-30 | Stop reason: HOSPADM

## 2025-04-30 RX ORDER — CLINDAMYCIN PHOSPHATE 600 MG/50ML
600 INJECTION, SOLUTION INTRAVENOUS ONCE
Status: COMPLETED | OUTPATIENT
Start: 2025-04-30 | End: 2025-04-30

## 2025-04-30 RX ORDER — IOPAMIDOL 612 MG/ML
100 INJECTION, SOLUTION INTRAVASCULAR
Status: COMPLETED | OUTPATIENT
Start: 2025-04-30 | End: 2025-04-30

## 2025-04-30 RX ADMIN — PIPERACILLIN SODIUM AND TAZOBACTAM SODIUM 4.5 G: 4; .5 INJECTION, POWDER, LYOPHILIZED, FOR SOLUTION INTRAVENOUS at 17:55

## 2025-04-30 RX ADMIN — POTASSIUM CHLORIDE 10 MEQ: 7.46 INJECTION, SOLUTION INTRAVENOUS at 19:49

## 2025-04-30 RX ADMIN — IOPAMIDOL 98 ML: 612 INJECTION, SOLUTION INTRAVENOUS at 18:05

## 2025-04-30 RX ADMIN — CLINDAMYCIN IN 5 PERCENT DEXTROSE 600 MG: 12 INJECTION, SOLUTION INTRAVENOUS at 19:39

## 2025-04-30 RX ADMIN — TETANUS TOXOID, REDUCED DIPHTHERIA TOXOID AND ACELLULAR PERTUSSIS VACCINE, ADSORBED 0.5 ML: 5; 2.5; 8; 8; 2.5 SUSPENSION INTRAMUSCULAR at 17:57

## 2025-04-30 RX ADMIN — SODIUM CHLORIDE, POTASSIUM CHLORIDE, SODIUM LACTATE AND CALCIUM CHLORIDE 200 ML/HR: 600; 310; 30; 20 INJECTION, SOLUTION INTRAVENOUS at 19:38

## 2025-04-30 RX ADMIN — VANCOMYCIN HYDROCHLORIDE 2500 MG: 1 INJECTION, POWDER, LYOPHILIZED, FOR SOLUTION INTRAVENOUS at 18:42

## 2025-04-30 RX ADMIN — SODIUM CHLORIDE, POTASSIUM CHLORIDE, SODIUM LACTATE AND CALCIUM CHLORIDE 1000 ML: 600; 310; 30; 20 INJECTION, SOLUTION INTRAVENOUS at 17:55

## 2025-04-30 NOTE — FSED PROVIDER NOTE
"  [place]    EMERGENCY DEPARTMENT ENCOUNTER      Pt Name: Venu Bustillos  MRN: [id number]  YOB: 1995  Date of evaluation: 4/30/2025  Provider: Gisela Stone PA-C    CHIEF COMPLAINT       Chief Complaint   Patient presents with   • Animal Bite       HISTORY OF PRESENT ILLNESS  (Location/Symptom, Timing/Onset, Context/Setting, Quality, Duration, Modifying Factors, Severity.)   Venu Bustillos is a 29 y.o. male who presents to the emergency department with dog bite to the left volar aspect of the forearm.  It occurred 2 days ago.  Patient states that he sewed the wound himself at home.  He is not a provider.  He states that \" my mom works at [employer] and has for over 30 years and I have learned finger to from her.\" patient reports that he sewed the wound with 3-0 chromic suture material he obtained from his mother.  He does not recall when his last tetanus vaccination was.  He states that for the last 24 hours the arm has had increased redness, warmth, swelling, discharge and drainage from the sutures and wound site.  He did remove 2 of the 3 sutures he placed at home prior to coming.  His temperature is reported to have been 103 Fahrenheit orally yesterday.  Patient does not know the owner of the dog or the vaccination status of the dog.    HPI   Nursing notes were reviewed.  REVIEW OF SYSTEMS    (2-9 systems for level 4, 10 or more for level 5)   Review of Systems   Constitutional:  Positive for chills and fever.   Musculoskeletal:  Positive for joint swelling.   Skin:  Positive for color change and wound.   All other systems reviewed and are negative.       All systems reviewed and negative except for those discussed in HPI.   PAST MEDICAL HISTORY     Past Medical History:   Diagnosis Date   • Alcohol addiction    • COVID    • Injury of back    • Narcotic addiction        SURGICAL HISTORY       Past Surgical History:   Procedure Laterality Date   • FEMUR SURGERY Right 2021   • HIP SURGERY Right " 2021   • PREPERITONEAL HERNIA REPAIR Right     removal of hernia from right leg   • TONSILLECTOMY         CURRENT MEDICATIONS       Current Facility-Administered Medications:   •  lactated ringers bolus 500 mL, 500 mL, Intravenous, PRN, Gisela Stone PA-C  •  lactated ringers infusion, 200 mL/hr, Intravenous, Continuous, Gisela Stone PA-C, Last Rate: 200 mL/hr at 04/30/25 1938, 200 mL/hr at 04/30/25 1938  •  sodium chloride 0.9 % flush 10 mL, 10 mL, Intravenous, PRN, Lauri Loving MD  •  vancomycin 2500 mg/500 mL 0.9% NS IVPB ([facility]), 20 mg/kg, Intravenous, Once, Gisela Stone PA-C, 2,500 mg at 04/30/25 1842    Current Outpatient Medications:   •  atorvastatin (LIPITOR) 10 MG tablet, Take 1 tablet by mouth Daily., Disp: 90 tablet, Rfl: 3  •  lisinopril (PRINIVIL,ZESTRIL) 5 MG tablet, Take 2 tablets by mouth Daily., Disp: , Rfl:   •  metoprolol succinate XL (TOPROL-XL) 25 MG 24 hr tablet, Take 2 tablets by mouth Daily., Disp: , Rfl:   •  nitroglycerin (NITROSTAT) 0.4 MG SL tablet, Place 1 tablet under the tongue Every 5 (Five) Minutes As Needed., Disp: , Rfl:   •  Omega-3 Fatty Acids (fish oil) 1000 MG capsule capsule, Take  by mouth Daily With Breakfast., Disp: , Rfl:   •  Testosterone Cypionate (DEPOTESTOTERONE CYPIONATE) 200 MG/ML injection, Inject 1 mL into the appropriate muscle as directed by prescriber Every 28 (Twenty-Eight) Days., Disp: , Rfl:   •  Ventolin  (90 Base) MCG/ACT inhaler, INHALE TWO PUFFS BY MOUTH EVERY 4 HOURS AS NEEDED FOR WHEEZING, Disp: 18 g, Rfl: 0    ALLERGIES     Patient has no known allergies.    FAMILY HISTORY       Family History   Problem Relation Age of Onset   • Diabetes Mother    • Diabetes Father         SOCIAL HISTORY       Social History     Socioeconomic History   • Marital status: Single   Tobacco Use   • Smoking status: Former     Types: Cigarettes   • Smokeless tobacco: Never   Vaping Use   • Vaping status: Never Used   Substance and Sexual Activity    • Alcohol use: Not Currently     Comment: sobriety date 5/1/2019   • Drug use: Not Currently     Types: Marijuana   • Sexual activity: Not Currently     Partners: Female     Birth control/protection: Condom       PHYSICAL EXAM    (up to 7 for level 4, 8 or more for level 5)   Physical Exam  Vitals and nursing note reviewed.   Constitutional:       General: He is not in acute distress.     Appearance: Normal appearance. He is not ill-appearing, toxic-appearing or diaphoretic.   HENT:      Head: Normocephalic and atraumatic.      Right Ear: External ear normal.      Left Ear: External ear normal.      Nose: Nose normal.      Mouth/Throat:      Mouth: Mucous membranes are moist.   Eyes:      Extraocular Movements: Extraocular movements intact.      Conjunctiva/sclera: Conjunctivae normal.   Cardiovascular:      Rate and Rhythm: Tachycardia present.      Pulses: Normal pulses.           Radial pulses are 2+ on the right side and 2+ on the left side.      Heart sounds: Normal heart sounds. No murmur heard.  Pulmonary:      Effort: Pulmonary effort is normal. No respiratory distress.      Breath sounds: Normal breath sounds. No wheezing.   Chest:      Chest wall: No tenderness.   Abdominal:      General: Abdomen is flat.      Palpations: Abdomen is soft.   Musculoskeletal:         General: Swelling, tenderness and signs of injury present. No deformity.      Cervical back: Normal range of motion and neck supple.   Skin:     General: Skin is warm.      Capillary Refill: Capillary refill takes less than 2 seconds.      Findings: Erythema present.             Comments: Dog bite left forearm volar with drainage and cellulitis crossing elbow and wrist with pain in hand and decreased range of motion.    Neurological:      General: No focal deficit present.      Mental Status: He is alert and oriented to person, place, and time.   Psychiatric:         Mood and Affect: Mood normal.         Behavior: Behavior normal.    PHOTO OF  THE ARM IS LOADED IN CHART MEDIA. DORSAL AND VOLAR.       DIAGNOSTIC RESULTS     EKG: All EKGs are interpreted by the Emergency Department Physician who either signs or Co-signs this chart in the absence of a cardiologist.    Telemetry Scan   Final Result      Telemetry Scan   Final Result          RADIOLOGY:   Non-plain film images such as CT, Ultrasound and MRI are read by the radiologist. Plain radiographic images are visualized and preliminarily interpreted by the emergency physician with the below findings:    [x] Radiologist's Report Reviewed:  CT Upper Extremity Left With Contrast   Final Result   Impression:   CT scan of the left arm with IV contrast demonstrating ill-defined increased density in subcutaneous soft tissues of the forearm consistent with cellulitis.      Numerous tiny air or gas collections in subcutaneous fat may represent air or gas from infection      No foreign body is seen.      The deep fascia is thickened, and fluid is evident. Injury to the musculature in the flexor compartment not excluded.            Electronically Signed: Timoteo Johnson MD     4/30/2025 7:00 PM EDT     Workstation ID: [id number]      XR Chest 1 View   Final Result   Impression:   No active disease is seen.         Electronically Signed: Timoteo Johnson MD     4/30/2025 6:04 PM EDT     Workstation ID: [id number]      XR Forearm 2 View Left   Final Result   Impression:   Left forearm series demonstrating diffuse increased density in subcutaneous soft tissues consistent with cellulitis.      Nonspecific gas collections are seen in soft tissues in the anterior mid forearm, as above.         Electronically Signed: Timoteo Johnson MD     4/30/2025 6:08 PM EDT     Workstation ID: [id number]          ED BEDSIDE ULTRASOUND:   Performed by ED Physician - none    LABS:    I have reviewed and interpreted all of the currently available lab results from this visit (if applicable):  Results for orders placed or performed during the  hospital encounter of 04/30/25   Comprehensive Metabolic Panel    Collection Time: 04/30/25  5:25 PM    Specimen: Blood   Result Value Ref Range    Glucose 182 (H) 65 - 99 mg/dL    BUN 5 (L) 6 - 20 mg/dL    Creatinine 0.68 (L) 0.76 - 1.27 mg/dL    Sodium 140 136 - 145 mmol/L    Potassium 3.3 (L) 3.5 - 5.2 mmol/L    Chloride 101 98 - 107 mmol/L    CO2 21.6 (L) 22.0 - 29.0 mmol/L    Calcium 9.0 8.6 - 10.5 mg/dL    Total Protein 6.8 6.0 - 8.5 g/dL    Albumin 4.0 3.5 - 5.2 g/dL    ALT (SGPT) 64 (H) 1 - 41 U/L    AST (SGOT) 42 (H) 1 - 40 U/L    Alkaline Phosphatase 48 39 - 117 U/L    Total Bilirubin 0.6 0.0 - 1.2 mg/dL    Globulin 2.8 gm/dL    A/G Ratio 1.4 g/dL    BUN/Creatinine Ratio 7.4 7.0 - 25.0    Anion Gap 17.4 (H) 5.0 - 15.0 mmol/L    eGFR 127.4 >60.0 mL/min/1.73   Lactic Acid, Plasma    Collection Time: 04/30/25  5:25 PM    Specimen: Blood   Result Value Ref Range    Lactate 2.4 (C) 0.5 - 2.0 mmol/L   CBC Auto Differential    Collection Time: 04/30/25  5:25 PM    Specimen: Blood   Result Value Ref Range    WBC 13.54 (H) 3.40 - 10.80 10*3/mm3    RBC 4.49 4.14 - 5.80 10*6/mm3    Hemoglobin 13.8 13.0 - 17.7 g/dL    Hematocrit 40.1 37.5 - 51.0 %    MCV 89.3 79.0 - 97.0 fL    MCH 30.7 26.6 - 33.0 pg    MCHC 34.4 31.5 - 35.7 g/dL    RDW 13.6 12.3 - 15.4 %    RDW-SD 45.0 37.0 - 54.0 fl    MPV 10.9 6.0 - 12.0 fL    Platelets 155 140 - 450 10*3/mm3    Neutrophil % 68.0 42.7 - 76.0 %    Lymphocyte % 21.7 19.6 - 45.3 %    Monocyte % 10.0 5.0 - 12.0 %    Eosinophil % 0.1 (L) 0.3 - 6.2 %    Basophil % 0.1 0.0 - 1.5 %    Immature Grans % 0.1 0.0 - 0.5 %    Neutrophils, Absolute 9.18 (H) 1.70 - 7.00 10*3/mm3    Lymphocytes, Absolute 2.94 0.70 - 3.10 10*3/mm3    Monocytes, Absolute 1.36 (H) 0.10 - 0.90 10*3/mm3    Eosinophils, Absolute 0.02 0.00 - 0.40 10*3/mm3    Basophils, Absolute 0.02 0.00 - 0.20 10*3/mm3    Immature Grans, Absolute 0.02 0.00 - 0.05 10*3/mm3   Protime-INR    Collection Time: 04/30/25  5:25 PM    Specimen:  Blood   Result Value Ref Range    Protime 14.1 12.2 - 14.5 Seconds    INR 1.07 0.89 - 1.12   C-reactive Protein    Collection Time: 04/30/25  5:25 PM    Specimen: Blood   Result Value Ref Range    C-Reactive Protein 14.00 (H) 0.00 - 0.50 mg/dL   Magnesium    Collection Time: 04/30/25  5:25 PM    Specimen: Blood   Result Value Ref Range    Magnesium 1.9 1.6 - 2.6 mg/dL   Procalcitonin    Collection Time: 04/30/25  5:25 PM    Specimen: Blood   Result Value Ref Range    Procalcitonin 0.07 0.00 - 0.25 ng/mL   Green Top (Gel)    Collection Time: 04/30/25  5:25 PM   Result Value Ref Range    Extra Tube Hold for add-ons.    Lavender Top    Collection Time: 04/30/25  5:25 PM   Result Value Ref Range    Extra Tube hold for add-on    Gold Top - SST    Collection Time: 04/30/25  5:25 PM   Result Value Ref Range    Extra Tube Hold for add-ons.    Gray Top    Collection Time: 04/30/25  5:25 PM   Result Value Ref Range    Extra Tube Hold for add-ons.    Light Blue Top    Collection Time: 04/30/25  5:25 PM   Result Value Ref Range    Extra Tube Hold for add-ons.    Sedimentation Rate    Collection Time: 04/30/25  5:30 PM    Specimen: Blood   Result Value Ref Range    Sed Rate 33 (H) 0 - 15 mm/hr   Urine Drug Screen - Urine, Clean Catch    Collection Time: 04/30/25  6:41 PM    Specimen: Urine, Clean Catch   Result Value Ref Range    THC, Screen, Urine Positive (A) Negative    Phencyclidine (PCP), Urine Negative Negative    Cocaine Screen, Urine Negative Negative    Methamphetamine, Ur Negative Negative    Opiate Screen Negative Negative    Amphetamine Screen, Urine Negative Negative    Benzodiazepine Screen, Urine Negative Negative    Tricyclic Antidepressants Screen Negative Negative    Methadone Screen, Urine Negative Negative    Barbiturates Screen, Urine Negative Negative    Oxycodone Screen, Urine Negative Negative    Buprenorphine, Screen, Urine Negative Negative   Fentanyl, Urine - Urine, Clean Catch    Collection Time:  04/30/25  6:41 PM    Specimen: Urine, Clean Catch   Result Value Ref Range    Fentanyl, Urine Negative Negative        All other labs were within normal range or not returned as of this dictation.    EMERGENCY DEPARTMENT COURSE and DIFFERENTIAL DIAGNOSIS/MDM:   Vitals:    Vitals:    04/30/25 1830 04/30/25 1900 04/30/25 1952 04/30/25 2011   BP: 145/85 128/81 141/86 129/79   BP Location:    Right arm   Patient Position:    Lying   Pulse: 112 108 109 107   Resp:    20   Temp:    99.8 °F (37.7 °C)   TempSrc:    Oral   SpO2: 92% 95% 93% 96%   Weight:       Height:           ED Course as of 04/30/25 2059 Wed Apr 30, 2025 1911 Delay in dispo, lab is looking for CBC.   [JG]   1938 ALT (SGPT)(!): 64 [JG]      ED Course User Index  [JG] Gisela Stone PA-C       Patient was evaluated and labs were obtained.  He was tachycardic upon arrival in no acute distress and afebrile but reported a temperature of 103 yesterday.  Initial impression of the significant cellulitis and wound with discharge and drainage prompted sepsis evaluation and treatment with prompt intravenous broad-spectrum antibiotics.  CT of the left upper extremity was obtained which revealed potential signs of necrotizing fasciitis with gas within the tissue and there was also concern for flexor tenosynovitis due to his contracture of the hand at the wrist, pain with passive flexion and decreased range of motion.  The CT scan did reveal fluid within the flexor compartment of the wrist.  I spoke with our transfer center and we do not have hand surgery or plastics on-call today.  They advised transfer to another facility for higher level of care.  [facility] was consulted and I spoke with Jenny Chirinos provider Dr. Murrieta.  [facility] was on divert and they had to call me back after speaking with the on-call orthopedic surgeon.  Patient was accepted overriding divert for surgical emergency.  The patient did not want to travel via EMS but  after in-depth conversation about the risk of transfer and continuing his antibiotics during transfer patient agreed.    Discussed rabies vaccination prophylaxis and treatment with the patient due to the unknown status of the dog.  Patient does not want to do this at this time.  He declined rabies vaccination here.  I discussed this with surgeon at  and he advised consultation with infectious disease.    DIFFERENTIAL DIAGNOSIS    Differential diagnosis considered in the evaluation and treatment of patient include but not limited to: Necrotizing fasciitis, abscess, flexor tenosynovitis, cellulitis.    MDM       I had a discussion with the patient/family regarding diagnosis, diagnostic results, treatment plan, and medications.  The patient/family indicated understanding of these instructions.  I spent adequate time at the bedside preceding transfer and personally discussed risk versus benefits of treatment and decision making.    MEDICATIONS ADMINISTERED IN ED:  Medications   sodium chloride 0.9 % flush 10 mL (has no administration in time range)   lactated ringers bolus 500 mL (has no administration in time range)   lactated ringers infusion (200 mL/hr Intravenous New Bag 4/30/25 1938)   vancomycin 2500 mg/500 mL 0.9% NS IVPB ([facility]) (2,500 mg Intravenous New Bag 4/30/25 1842)   lactated ringers bolus 1,000 mL (0 mL Intravenous Stopped 4/30/25 1927)   Tetanus-Diphth-Acell Pertussis (BOOSTRIX) injection 0.5 mL (0.5 mL Intramuscular Given 4/30/25 1757)   piperacillin-tazobactam (ZOSYN) 4.5 g IVPB in 100 mL NS MBP (CD) (0 g Intravenous Stopped 4/30/25 1840)   iopamidol (ISOVUE-300) 61 % injection 100 mL (98 mL Intravenous Given 4/30/25 1805)   clindamycin (CLEOCIN) 600 mg in dextrose 5% 50 mL IVPB (premix) (600 mg Intravenous New Bag 4/30/25 1939)   potassium chloride 10 mEq in 100 mL IVPB (10 mEq Intravenous New Bag 4/30/25 1949)       PROCEDURES:  Procedures          CRITICAL CARE TIME    Total Critical Care time was  30 minutes, excluding separately reportable procedures.   There was a high probability of clinically significant/life threatening deterioration in the patient's condition which required my urgent intervention.    FINAL IMPRESSION      1. Necrotizing fasciitis of forearm    2. Dog bite of left forearm with infection, initial encounter    3. Sepsis without acute organ dysfunction, due to unspecified organism    4. Cellulitis of left upper extremity    5. Tenosynovitis of wrist flexor          DISPOSITION/PLAN     ED Disposition       ED Disposition   Transfer to Another Facility     Condition   --    Comment   [facility] Dr. Murrieta accepts to ED.                PATIENT REFERRED TO:  No follow-up provider specified.    DISCHARGE MEDICATIONS:     Medication List        CONTINUE taking these medications      atorvastatin 10 MG tablet  Commonly known as: LIPITOR  Take 1 tablet by mouth Daily.     fish oil 1000 MG capsule capsule     lisinopril 5 MG tablet  Commonly known as: PRINIVIL,ZESTRIL     metoprolol succinate XL 25 MG 24 hr tablet  Commonly known as: TOPROL-XL     nitroglycerin 0.4 MG SL tablet  Commonly known as: NITROSTAT     Testosterone Cypionate 200 MG/ML injection  Commonly known as: DEPOTESTOTERONE CYPIONATE     Ventolin  (90 Base) MCG/ACT inhaler  Generic drug: albuterol sulfate HFA  INHALE TWO PUFFS BY MOUTH EVERY 4 HOURS AS NEEDED FOR WHEEZING              Comment: Please note this report has been produced using speech recognition software.      Gisela Stone PA-C

## 2025-05-04 LAB
BACTERIA SPEC AEROBE CULT: ABNORMAL
BACTERIA SPEC AEROBE CULT: ABNORMAL
GRAM STN SPEC: ABNORMAL
GRAM STN SPEC: ABNORMAL

## 2025-05-05 LAB
BACTERIA SPEC AEROBE CULT: NORMAL
BACTERIA SPEC AEROBE CULT: NORMAL